# Patient Record
Sex: FEMALE | Race: WHITE | NOT HISPANIC OR LATINO | Employment: FULL TIME | URBAN - METROPOLITAN AREA
[De-identification: names, ages, dates, MRNs, and addresses within clinical notes are randomized per-mention and may not be internally consistent; named-entity substitution may affect disease eponyms.]

---

## 2018-04-20 ENCOUNTER — OFFICE VISIT (OUTPATIENT)
Dept: ENDOCRINOLOGY | Age: 53
End: 2018-04-20

## 2018-04-20 ENCOUNTER — TELEPHONE (OUTPATIENT)
Dept: ENDOCRINOLOGY | Age: 53
End: 2018-04-20

## 2018-04-20 VITALS
BODY MASS INDEX: 27.96 KG/M2 | WEIGHT: 163.8 LBS | DIASTOLIC BLOOD PRESSURE: 78 MMHG | SYSTOLIC BLOOD PRESSURE: 118 MMHG | HEIGHT: 64 IN

## 2018-04-20 DIAGNOSIS — IMO0002 UNCONTROLLED TYPE 2 DIABETES MELLITUS WITH COMPLICATION, WITHOUT LONG-TERM CURRENT USE OF INSULIN: Primary | ICD-10-CM

## 2018-04-20 DIAGNOSIS — K75.81 NASH (NONALCOHOLIC STEATOHEPATITIS): ICD-10-CM

## 2018-04-20 DIAGNOSIS — E78.2 MIXED HYPERLIPIDEMIA: ICD-10-CM

## 2018-04-20 PROCEDURE — 99204 OFFICE O/P NEW MOD 45 MIN: CPT | Performed by: NURSE PRACTITIONER

## 2018-04-20 RX ORDER — OMEGA-3-ACID ETHYL ESTERS 1 G/1
1 CAPSULE, LIQUID FILLED ORAL 2 TIMES DAILY
Qty: 120 CAPSULE | Refills: 4 | Status: SHIPPED | OUTPATIENT
Start: 2018-04-20 | End: 2018-10-24 | Stop reason: SDUPTHER

## 2018-04-20 RX ORDER — LISINOPRIL 10 MG/1
10 TABLET ORAL DAILY
Qty: 30 TABLET | Refills: 4 | Status: SHIPPED | OUTPATIENT
Start: 2018-04-20 | End: 2018-04-20

## 2018-04-20 RX ORDER — PIOGLITAZONEHYDROCHLORIDE 30 MG/1
30 TABLET ORAL DAILY
Qty: 30 TABLET | Refills: 4 | Status: SHIPPED | OUTPATIENT
Start: 2018-04-20 | End: 2018-09-18 | Stop reason: SDUPTHER

## 2018-04-20 RX ORDER — LISINOPRIL 5 MG/1
5 TABLET ORAL DAILY
Qty: 30 TABLET | Refills: 4 | Status: SHIPPED | OUTPATIENT
Start: 2018-04-20 | End: 2018-09-18 | Stop reason: SDUPTHER

## 2018-04-20 RX ORDER — ROSUVASTATIN CALCIUM 20 MG/1
20 TABLET, COATED ORAL NIGHTLY
Qty: 30 TABLET | Refills: 4 | Status: SHIPPED | OUTPATIENT
Start: 2018-04-20 | End: 2018-09-14 | Stop reason: SDUPTHER

## 2018-04-20 RX ORDER — ATORVASTATIN CALCIUM 20 MG/1
20 TABLET, FILM COATED ORAL DAILY
Qty: 30 TABLET | Refills: 4 | Status: SHIPPED | OUTPATIENT
Start: 2018-04-20 | End: 2018-04-20

## 2018-04-20 NOTE — PATIENT INSTRUCTIONS
Stop The Jardiance    Start Synjardy 12.5/1000 mg 1 by mouth twice daily with this medication take over-the-counter vitamin C 500 mg and at least 1000 international units vitamin D daily increase water at least 3-4 bottles daily    Start Januvia 100mg daily    Start omega-3 fatty acids-Lovaza 1 g twice daily never    Start actos 30mg daily    Start lisinopril 5mg daily    Crestor 20 mg once daily    home blood tests -  Blood glucose testing: 3-4 times daily, that are: fasting- 1st thing in morning before eating or drinking, before each meal and 1 or 2 hours after meal  Bedtime, anytime you feel symptoms of hyperglycemia or hypoglycemia (high or low blood sugars)

## 2018-04-20 NOTE — TELEPHONE ENCOUNTER
----- Message from Junior Luis sent at 4/20/2018 10:59 AM EDT -----  Contact: FADI FROM [facility]  FADI FROM [facility] STATED THAT THEY RECEIVED A SCRIPT FOR CRESTOR) 20 MG  AND LIPITOR SHE STATED THAT THE PATIENT ONLY NEEDS TO BE ON ONE OF THESES, SHE WOULD LIKE CLARIFICATION .     ALSO lisinopril (PRINIVIL,ZESTRIL) 5 MG tablet, FADI SAID SHE HAS A 10MG AND 5MG SCRIPT SHE IS TRYING TO GET THIS FIGURED OUT.     PLEASE CALL BACK AND ADVISE. [phone]

## 2018-04-23 ENCOUNTER — PRIOR AUTHORIZATION (OUTPATIENT)
Dept: ENDOCRINOLOGY | Age: 53
End: 2018-04-23

## 2018-04-24 ENCOUNTER — TELEPHONE (OUTPATIENT)
Dept: ENDOCRINOLOGY | Age: 53
End: 2018-04-24

## 2018-04-24 DIAGNOSIS — E55.9 VITAMIN D DEFICIENCY: ICD-10-CM

## 2018-04-24 DIAGNOSIS — IMO0002 UNCONTROLLED TYPE 2 DIABETES MELLITUS WITH COMPLICATION, WITHOUT LONG-TERM CURRENT USE OF INSULIN: Primary | ICD-10-CM

## 2018-04-24 DIAGNOSIS — E24.9 HYPERCORTICISM (HCC): ICD-10-CM

## 2018-04-24 DIAGNOSIS — E03.8 SECONDARY HYPOTHYROIDISM: Primary | ICD-10-CM

## 2018-04-24 LAB
25(OH)D3+25(OH)D2 SERPL-MCNC: 25.8 NG/ML (ref 30–100)
ACTH PLAS-MCNC: 12.8 PG/ML (ref 7.2–63.3)
ALBUMIN SERPL-MCNC: 4.7 G/DL (ref 3.5–5.2)
ALBUMIN/GLOB SERPL: 2 G/DL
ALDOST SERPL-MCNC: 7.6 NG/DL (ref 0–30)
ALP SERPL-CCNC: 54 U/L (ref 39–117)
ALT SERPL-CCNC: 115 U/L (ref 1–33)
AST SERPL-CCNC: 52 U/L (ref 1–32)
BILIRUB SERPL-MCNC: 0.9 MG/DL (ref 0.1–1.2)
BUN SERPL-MCNC: 15 MG/DL (ref 6–20)
BUN/CREAT SERPL: 24.6 (ref 7–25)
C PEPTIDE SERPL-MCNC: 3.8 NG/ML (ref 1.1–4.4)
CALCIUM SERPL-MCNC: 9.5 MG/DL (ref 8.6–10.5)
CHLORIDE SERPL-SCNC: 99 MMOL/L (ref 98–107)
CHOLEST SERPL-MCNC: 252 MG/DL (ref 0–200)
CO2 SERPL-SCNC: 24.9 MMOL/L (ref 22–29)
CORTIS SERPL-MCNC: 10.3 UG/DL
CREAT SERPL-MCNC: 0.61 MG/DL (ref 0.57–1)
DHEA SERPL-MCNC: 29 NG/DL (ref 31–701)
DHEA-S SERPL-MCNC: 20.5 UG/DL (ref 41.2–243.7)
FRUCTOSAMINE SERPL-SCNC: 307 UMOL/L (ref 0–285)
GFR SERPLBLD CREATININE-BSD FMLA CKD-EPI: 104 ML/MIN/1.73
GFR SERPLBLD CREATININE-BSD FMLA CKD-EPI: 126 ML/MIN/1.73
GLOBULIN SER CALC-MCNC: 2.3 GM/DL
GLUCOSE SERPL-MCNC: 148 MG/DL (ref 65–99)
HBA1C MFR BLD: 8.5 % (ref 4.8–5.6)
HDLC SERPL-MCNC: 39 MG/DL (ref 40–60)
INSULIN SERPL-ACNC: 17.1 UIU/ML (ref 2.6–24.9)
INTERPRETATION: NORMAL
LDLC SERPL CALC-MCNC: 190 MG/DL (ref 0–100)
Lab: NORMAL
POTASSIUM SERPL-SCNC: 4.5 MMOL/L (ref 3.5–5.2)
PROT SERPL-MCNC: 7 G/DL (ref 6–8.5)
SODIUM SERPL-SCNC: 140 MMOL/L (ref 136–145)
T3 SERPL-MCNC: 143.8 NG/DL (ref 80–200)
T3FREE SERPL-MCNC: 3.4 PG/ML (ref 2–4.4)
T4 FREE SERPL-MCNC: 1.25 NG/DL (ref 0.93–1.7)
T4 SERPL-MCNC: 8.82 MCG/DL (ref 4.5–11.7)
THYROGLOB AB SERPL-ACNC: <1 IU/ML (ref 0–0.9)
THYROPEROXIDASE AB SERPL-ACNC: 9 IU/ML (ref 0–34)
TRIGL SERPL-MCNC: 113 MG/DL (ref 0–150)
TSH SERPL DL<=0.005 MIU/L-ACNC: 4.29 MIU/ML (ref 0.27–4.2)
URATE SERPL-MCNC: 3.7 MG/DL (ref 2.4–5.7)
VLDLC SERPL CALC-MCNC: 22.6 MG/DL (ref 5–40)

## 2018-04-24 RX ORDER — ERGOCALCIFEROL 1.25 MG/1
50000 CAPSULE ORAL WEEKLY
Qty: 12 CAPSULE | Refills: 3 | Status: SHIPPED | OUTPATIENT
Start: 2018-04-24 | End: 2018-11-29 | Stop reason: SDUPTHER

## 2018-04-24 RX ORDER — LEVOTHYROXINE SODIUM 88 MCG
88 TABLET ORAL DAILY
Qty: 30 TABLET | Refills: 3 | Status: SHIPPED | OUTPATIENT
Start: 2018-04-24 | End: 2018-07-25

## 2018-04-24 RX ORDER — DEXAMETHASONE 1 MG
1 TABLET ORAL ONCE
Qty: 1 TABLET | Refills: 0 | Status: SHIPPED | OUTPATIENT
Start: 2018-04-24 | End: 2018-04-24

## 2018-04-24 NOTE — TELEPHONE ENCOUNTER
----- Message from MACEY Parekh sent at 4/24/2018  2:08 PM EDT -----  Labs show that she is hypothyroid sent a prescription over for Synthroid 88 MCG one daily on empty stomach in morning.  Empty stomach for 30 minutes after taking medication.  Recheck labs in 6 weeks.  Low vitamin D sent a prescription over for 50,000 once a week.  [facility] ordered DST testing.  She can get this done in 6 weeks with her other lab work

## 2018-04-24 NOTE — TELEPHONE ENCOUNTER
----- Message from MACEY Parekh sent at 4/24/2018  8:58 AM EDT -----  Contact: PATIENT   I will lower the dose of Synjardy 12.5/500 twice daily.  If this does not work we have to go a route with treatment.  And please inform patient that pharmacist does not have her labs her self-monitoring blood glucose and her history in front of the home and they go by what in the PI    ----- Message -----  From: Marta Carcamo MA  Sent: 4/23/2018   4:41 PM  To: MACEY Parekh    Patient states that had only taken the 1 pill and it caused extreme stomach pain and diarrhea. She states that she spoke to her pharmacist and he told her that it was probably too much metformin for her system to handle. She is asking for a lower dose or to not take it at all. Please advise.     ----- Message -----  From: MACEY Parekh  Sent: 4/23/2018   1:08 PM  To: Marta Carcamo MA    Restart the synjardy - 1 in the AM only for 1 week- then increase to twice daily.   ----- Message -----  From: Marta Carcamo MA  Sent: 4/23/2018  10:46 AM  To: MACEY Parekh    Please advise.     ----- Message -----  From: Junior Luis  Sent: 4/23/2018   9:33 AM  To: Marta Carcamo MA    PATIENT SAID THAT SHE STARTED SYNJARDI ON SUNDAY, SHE STARTED CRAMPING AND THEN HAD DIAHERREA, SHE HAS 3 MORE PILLS TO TRY. SHE WAS SCARED TO TRY ANYTHING ELSE SINCE SHE GOT SICK. THE PHARMACIST TOLD HER IT MAY BE THAT IT WAS TOO MUCH SINCE SHE WAS TAKING METFORMIN 500,     WAS TAKEN OFF METFORMIN DUE TO LIVER BEING ELEVATED. PATIENT HAS SPOT ON LIVER BUT SAID IT WASN'T ANYTHING TO WORRY ABOUT.     PATIENT WANTS TO KNOW IF SHE NEEDS TO TAKE A LOWER DOSE OF SYNJARDI, OR TRY IT AGAIN. SHE WOULD LIKE TO BE ADVISED WHAT TO Do.     SHE SAID THAT HER BODY DOES NOT DO WELL ON MEDICATIONS.   SHE ALSO HAS A WHOLE BOTTLE OF METFORMIN IF SHE NEEDS TO GO BACK ON THAT JARDIANCE, SHE HAS BEEN ON THAT FOR THE PAST COUPLE OF MONTHS. SHE HAS 8  PILLS OF JARDIANCE.    PATIENT SAID SUGAR IS LIKE 150 DOWN AND WANTS TO KNOW IF SHE NEEDS TO TAKE THE JARDIANCE TO GET IT DOWN OR TRY THE SYNJARDI AGAIN.     BEST # [phone]

## 2018-04-27 ENCOUNTER — TELEPHONE (OUTPATIENT)
Dept: ENDOCRINOLOGY | Age: 53
End: 2018-04-27

## 2018-04-27 NOTE — TELEPHONE ENCOUNTER
----- Message from MACEY Parekh sent at 4/26/2018 10:00 PM EDT -----  Contact: PATIENT  Introduce the Synjardy.  She issues to metformin it would be the same sinuses I gave her Jardiance separate.  She is on metformin I added Jardiance to the metformin it's this same difference is her choice when she wants to take her medicine ultimately her diabetes is out of control that's why she was sent to our office.  ----- Message -----  From: Marta Carcamo MA  Sent: 4/25/2018  11:17 AM  To: MACEY Parekh    You can see previous conversation in chart. Please advise.     ----- Message -----  From: Junior Luis  Sent: 4/25/2018  10:55 AM  To: Marta Carcamo MA    PATIENT STATED THAT YOU ALL WERE SPEAKING ABOUT SCRIPT YESTERDAY AND SHE WANTED TO LET YOU KNOW THAT ONE IS STILL PENDING. omega-3 acid ethyl esters (LOVAZA)    PATIENT IS GOING ON VACATION IN 15 DAYS AND SHE HAS 3 MORE MEDICATION TO INTRODUCE INTO HER SYSTEM, SHE DOES NOT DO WELL ON MEDICATION. SHE HAS NOT HAD A BOWEL MOVEMENT IN 3 DAYS, DUE TO TAKING IMODIUM Ad.   PATIENT IS WANTING TO KNOW IF SHE CAN TAKE Empagliflozin (JARDIANCE) WHICH SHE IS USE To, UNTIL AFTER VACATION.     SHE WAS PRESCRIBED Empagliflozin-Metformin HCl 12.5-500 WHICH THE ORIGINAL SCRIPT WAS Empagliflozin-Metformin HCl 12.5-1000 AND IT GAVE HER DIARRHEA      PATIENT WOULD LIKE TO KNOW IF IT WOULD BE Ok. SHE ONLY HAS 7 PILLS OF THE JARDIANCE, IF IT IS OK FOR HER TO TAKE UNTIL AFTER VACATION, SHE WOULD NEED A SCRIPT CALLED In.    SUGAR  TWO HOURS AFTER SHE ATE LAST NIGHT, SO THE PILLS SHE IS TAKING IS WORKING, BUT SHE HAS TINGLING IN LEFT HAND, AND WHOLE BODY HURTS.  SAID THAT IT DONE THIS WHEN SHE WAS TAKING THE CHOLESTEROL MEDICATION BEFORE. SHE THINKS IT MAY TAKE TIME FOR HER BODY TO GET USE TO It.  SHE IS INTRODUCING 2 PILLS A DAY, SHE STILL HAS VIT. D WHICH SHE IS GOING TO TAKE ON Saturday. AND SHE DOES NOT HAVE THE OMEGA 3 YET.

## 2018-06-05 ENCOUNTER — RESULTS ENCOUNTER (OUTPATIENT)
Dept: ENDOCRINOLOGY | Age: 53
End: 2018-06-05

## 2018-06-05 DIAGNOSIS — E24.9 HYPERCORTICISM (HCC): ICD-10-CM

## 2018-06-05 DIAGNOSIS — E03.8 SECONDARY HYPOTHYROIDISM: ICD-10-CM

## 2018-06-11 ENCOUNTER — TELEPHONE (OUTPATIENT)
Dept: ENDOCRINOLOGY | Age: 53
End: 2018-06-11

## 2018-06-11 NOTE — TELEPHONE ENCOUNTER
----- Message from Junior York sent at 6/11/2018  1:33 PM EDT -----  Contact: PATIENT  PATIENT CALLED IN REFERENCE TO TAKING THE PILL DEXAMETHASONE 1 MG. PATIENT WANTS TO KNOW IF SHE IS SUPPOSE TO FAST AFTER TAKING THE PILL OR IF SHE CAN CONTINUE WITH HER DAILY ROUTINE WITH EATING AND DRINKING BEFORE THE LAB TESTING THE FOLLOWING MORNING BETWEEN 8-9 AM. 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 PATIENT WOULD LIKE A CALL BACK.    [facility] [phone] LEAVE MESSAGE IF PATIENT DOESN'T ANSWER

## 2018-06-14 LAB
ACTH PLAS-MCNC: 3.1 PG/ML (ref 7.2–63.3)
CORTIS SERPL-MCNC: 0.9 UG/DL
T3FREE SERPL-MCNC: 3.4 PG/ML (ref 2–4.4)
T4 FREE SERPL-MCNC: 2.1 NG/DL (ref 0.93–1.7)
THYROGLOB AB SERPL-ACNC: <1 IU/ML (ref 0–0.9)
THYROPEROXIDASE AB SERPL-ACNC: 8 IU/ML (ref 0–34)
TSH SERPL DL<=0.005 MIU/L-ACNC: 0.11 MIU/ML (ref 0.27–4.2)

## 2018-06-18 ENCOUNTER — TELEPHONE (OUTPATIENT)
Dept: ENDOCRINOLOGY | Age: 53
End: 2018-06-18

## 2018-06-18 NOTE — TELEPHONE ENCOUNTER
----- Message from MACEY Parekh sent at 6/14/2018  4:24 PM EDT -----  That suppression test within normal limits.

## 2018-07-11 ENCOUNTER — LAB (OUTPATIENT)
Dept: ENDOCRINOLOGY | Age: 53
End: 2018-07-11

## 2018-07-11 DIAGNOSIS — E78.2 MIXED HYPERLIPIDEMIA: ICD-10-CM

## 2018-07-11 DIAGNOSIS — IMO0002 UNCONTROLLED TYPE 2 DIABETES MELLITUS WITH COMPLICATION, WITHOUT LONG-TERM CURRENT USE OF INSULIN: ICD-10-CM

## 2018-07-11 DIAGNOSIS — K75.81 NASH (NONALCOHOLIC STEATOHEPATITIS): ICD-10-CM

## 2018-07-12 LAB
25(OH)D3+25(OH)D2 SERPL-MCNC: 42.1 NG/ML (ref 30–100)
ALBUMIN SERPL-MCNC: 4.9 G/DL (ref 3.5–5.2)
ALBUMIN/CREAT UR: 15.7 MG/G CREAT (ref 0–30)
ALBUMIN/GLOB SERPL: 2.3 G/DL
ALP SERPL-CCNC: 37 U/L (ref 39–117)
ALT SERPL-CCNC: 18 U/L (ref 1–33)
AST SERPL-CCNC: 11 U/L (ref 1–32)
BASOPHILS # BLD AUTO: 0.03 10*3/MM3 (ref 0–0.2)
BASOPHILS NFR BLD AUTO: 0.5 % (ref 0–1.5)
BILIRUB SERPL-MCNC: 1 MG/DL (ref 0.1–1.2)
BUN SERPL-MCNC: 17 MG/DL (ref 6–20)
BUN/CREAT SERPL: 23.3 (ref 7–25)
CALCIUM SERPL-MCNC: 9.3 MG/DL (ref 8.6–10.5)
CHLORIDE SERPL-SCNC: 104 MMOL/L (ref 98–107)
CHOLEST SERPL-MCNC: 127 MG/DL (ref 0–200)
CO2 SERPL-SCNC: 26.3 MMOL/L (ref 22–29)
CREAT SERPL-MCNC: 0.73 MG/DL (ref 0.57–1)
CREAT UR-MCNC: 61.8 MG/DL
DIFFERENTIAL COMMENT: NORMAL
EOSINOPHIL # BLD AUTO: 0.21 10*3/MM3 (ref 0–0.7)
EOSINOPHIL NFR BLD AUTO: 3.7 % (ref 0.3–6.2)
ERYTHROCYTE [DISTWIDTH] IN BLOOD BY AUTOMATED COUNT: 16.4 % (ref 11.7–13)
GLOBULIN SER CALC-MCNC: 2.1 GM/DL
GLUCOSE SERPL-MCNC: 82 MG/DL (ref 65–99)
HBA1C MFR BLD: 6.47 % (ref 4.8–5.6)
HCT VFR BLD AUTO: 39.2 % (ref 35.6–45.5)
HDLC SERPL-MCNC: 57 MG/DL (ref 40–60)
HGB BLD-MCNC: 11.7 G/DL (ref 11.9–15.5)
IMM GRANULOCYTES # BLD: 0.01 10*3/MM3 (ref 0–0.03)
IMM GRANULOCYTES NFR BLD: 0.2 % (ref 0–0.5)
INSULIN SERPL-ACNC: 8.4 UIU/ML (ref 2.6–24.9)
INTERPRETATION: NORMAL
LDLC SERPL CALC-MCNC: 58 MG/DL (ref 0–100)
LYMPHOCYTES # BLD AUTO: 2.01 10*3/MM3 (ref 0.9–4.8)
LYMPHOCYTES NFR BLD AUTO: 35.8 % (ref 19.6–45.3)
Lab: NORMAL
MCH RBC QN AUTO: 19.7 PG (ref 26.9–32)
MCHC RBC AUTO-ENTMCNC: 29.8 G/DL (ref 32.4–36.3)
MCV RBC AUTO: 66 FL (ref 80.5–98.2)
MICROALBUMIN UR-MCNC: 9.7 UG/ML
MONOCYTES # BLD AUTO: 0.38 10*3/MM3 (ref 0.2–1.2)
MONOCYTES NFR BLD AUTO: 6.8 % (ref 5–12)
NEUTROPHILS # BLD AUTO: 2.99 10*3/MM3 (ref 1.9–8.1)
NEUTROPHILS NFR BLD AUTO: 53.2 % (ref 42.7–76)
PLATELET # BLD AUTO: 184 10*3/MM3 (ref 140–500)
PLATELET BLD QL SMEAR: NORMAL
POTASSIUM SERPL-SCNC: 4.4 MMOL/L (ref 3.5–5.2)
PROT SERPL-MCNC: 7 G/DL (ref 6–8.5)
RBC # BLD AUTO: 5.94 10*6/MM3 (ref 3.9–5.2)
RBC MORPH BLD: NORMAL
SODIUM SERPL-SCNC: 144 MMOL/L (ref 136–145)
T3FREE SERPL-MCNC: 3.5 PG/ML (ref 2–4.4)
T4 FREE SERPL-MCNC: 2.09 NG/DL (ref 0.93–1.7)
THYROGLOB AB SERPL-ACNC: <1 IU/ML (ref 0–0.9)
THYROPEROXIDASE AB SERPL-ACNC: 7 IU/ML (ref 0–34)
TRIGL SERPL-MCNC: 58 MG/DL (ref 0–150)
TSH SERPL DL<=0.005 MIU/L-ACNC: 0.08 MIU/ML (ref 0.27–4.2)
URATE SERPL-MCNC: 2.7 MG/DL (ref 2.4–5.7)
VLDLC SERPL CALC-MCNC: 11.6 MG/DL (ref 5–40)
WBC # BLD AUTO: 5.62 10*3/MM3 (ref 4.5–10.7)

## 2018-07-25 ENCOUNTER — OFFICE VISIT (OUTPATIENT)
Dept: ENDOCRINOLOGY | Age: 53
End: 2018-07-25

## 2018-07-25 VITALS
DIASTOLIC BLOOD PRESSURE: 78 MMHG | HEIGHT: 64 IN | BODY MASS INDEX: 27.49 KG/M2 | SYSTOLIC BLOOD PRESSURE: 122 MMHG | WEIGHT: 161 LBS

## 2018-07-25 DIAGNOSIS — E78.2 MIXED HYPERLIPIDEMIA: ICD-10-CM

## 2018-07-25 DIAGNOSIS — E03.8 SECONDARY HYPOTHYROIDISM: ICD-10-CM

## 2018-07-25 DIAGNOSIS — IMO0002 UNCONTROLLED TYPE 2 DIABETES MELLITUS WITH COMPLICATION, WITHOUT LONG-TERM CURRENT USE OF INSULIN: Primary | ICD-10-CM

## 2018-07-25 PROBLEM — D56.8 BETA 0 THALASSEMIA (HCC): Status: ACTIVE | Noted: 2018-07-25

## 2018-07-25 PROCEDURE — 99214 OFFICE O/P EST MOD 30 MIN: CPT | Performed by: NURSE PRACTITIONER

## 2018-07-25 RX ORDER — LEVOTHYROXINE SODIUM 75 MCG
75 TABLET ORAL DAILY
Qty: 30 TABLET | Refills: 3 | Status: SHIPPED | OUTPATIENT
Start: 2018-07-25 | End: 2018-09-07

## 2018-07-25 NOTE — PATIENT INSTRUCTIONS
Stop Synthroid 88 MCG    Start Synthroid 75 MCG    Labs only in 6 weeks to check thyroid.    No other changes at this time

## 2018-07-25 NOTE — PROGRESS NOTES
Maru Martinez  presents to the office  for the follow-up appointment for Type 2 diabetes mellitus.     The diabete's condition location is throughout the system with the  clinical course has fluctuated, the severity is Mild, and the modifying/allievating factors are oral medications.  Medications and  Dosages were  reviewed with Maru Martinez and suggested that compliance most of the time.    The patient reports associated symptoms of hyperglycemia have been flushed and feeling hot and associated symptoms of hypoglycemia have been tingling in hands and feet , with their  hypoglycemia threshold for symptoms is 70 mg/dl .     The patient is currently on oral medications .      Compliance with blood glucose monitoring: fair.     Meal panning: The patient is using avoidance of concentrated sweets.    The patient is currently taking home blood tests - Blood glucose testin-1 times daily, that are:  fasting- 1st thing in morning before eating or drinking    Last instructions given were:  Stop The Jardiance     Start Synjardy 12.1000 mg 1 by mouth twice daily with this medication take over-the-counter vitamin C 500 mg and at least 1000 international units vitamin D daily increase water at least 3-4 bottles daily     Start Januvia 100mg daily     Start omega-3 fatty acids-Lovaza 1 g twice daily never     Start actos 30mg daily     Start lisinopril 5mg daily     Crestor 20 mg once daily    Home blood glucose testing daily: 0-1  FB to 90 mg/dl    Last reported blood glucose readings are:  Home blood glucose testing daily: 0-1  FB to 140 mg/dl    Patterns reported per patient are none.         The following portions of the patient's history were reviewed and updated as appropriate: current medications, past family history, past medical history, past social history, past surgical history and problem list.      Current Outpatient Prescriptions:   •  Empagliflozin-Metformin HCl 12.5-500 MG tablet, Take 12.5 mg by  "mouth 2 (Two) Times a Day., Disp: 60 tablet, Rfl: 4  •  glucose blood (FREESTYLE LITE) test strip, Test blood glucose 3 times daily, Disp: 300 each, Rfl: 2  •  lisinopril (PRINIVIL,ZESTRIL) 5 MG tablet, Take 1 tablet by mouth Daily., Disp: 30 tablet, Rfl: 4  •  omega-3 acid ethyl esters (LOVAZA) 1 g capsule, Take 1 capsule by mouth 2 (Two) Times a Day., Disp: 120 capsule, Rfl: 4  •  pioglitazone (ACTOS) 30 MG tablet, Take 1 tablet by mouth Daily., Disp: 30 tablet, Rfl: 4  •  rosuvastatin (CRESTOR) 20 MG tablet, Take 1 tablet by mouth Every Night., Disp: 30 tablet, Rfl: 4  •  SITagliptin (JANUVIA) 100 MG tablet, Take 1 tablet by mouth Daily., Disp: 30 tablet, Rfl: 4  •  vitamin D (ERGOCALCIFEROL) 36390 units capsule capsule, Take 1 capsule by mouth 1 (One) Time Per Week., Disp: 12 capsule, Rfl: 3  •  SYNTHROID 75 MCG tablet, Take 1 tablet by mouth Daily.  on an emptying stomach, Disp: 30 tablet, Rfl: 3    Patient Active Problem List    Diagnosis   • Beta 0 thalassemia (CMS/HCC) [D56.8]   • Uncontrolled type 2 diabetes mellitus with complication, without long-term current use of insulin (CMS/HCC) [E11.8, E11.65]   • Mixed hyperlipidemia [E78.2]   • PARKER (nonalcoholic steatohepatitis) [K75.81]       Review of Systems   A comprehensive review of systems was negative.- 14 systems reviewed.      Objective:     Wt Readings from Last 3 Encounters:   07/25/18 73 kg (161 lb)   04/20/18 74.3 kg (163 lb 12.8 oz)     Temp Readings from Last 3 Encounters:   No data found for Temp     BP Readings from Last 3 Encounters:   07/25/18 122/78   04/20/18 118/78     Pulse Readings from Last 3 Encounters:   No data found for Pulse        /78   Ht 162.6 cm (64.02\")   Wt 73 kg (161 lb)   BMI 27.62 kg/m²     General appearance:  alert, cooperative, delirious, fatigued, nourished\" \"appears stated age and cooperative\" \"NAD   Neck: no carotid bruit, supple, symmetrical, trachea midline and thyroid not enlarged, symmetric, no " "tenderness/mass/nodules   Thyroid:  no palpable nodule, no enlargement, no tenderness   Lung:  \"clear to auscultation bilaterally\" \"no abnormal breath sounds  \" effort was normal, no labored breath, no use of accessory muscles.   Heart: regular rate and rhythm, S1, S2 normal, no murmur, click, rub or gallop      Abdomen:  normal bowel sounds- 4 quads, soft non-tender and contour - slt rounded      Extremities: extremities normal, atraumatic, no cyanosis or edema extremities normal, atraumatic, no cyanosis or edema\" WNL - gait and station, strength and stability\"       Skin:   Pulses:  warm and dry, no hyperpigmentation, normal skin coloring, or suspicious lesions   2+ and symmetric   Neuro: Alert and oriented x3. Gait normal. Reflexes and motor strength normal and symmetric. Cranial nerves 2-12 and sensation grossly intact.      Psych: behavior - normal, judgement - normal, mood - normal, affect - normal                 Lab Review  Results for orders placed or performed in visit on 07/11/18   Comprehensive Metabolic Panel   Result Value Ref Range    Glucose 82 65 - 99 mg/dL    BUN 17 6 - 20 mg/dL    Creatinine 0.73 0.57 - 1.00 mg/dL    eGFR Non African Am 84 >60 mL/min/1.73    eGFR African Am 102 >60 mL/min/1.73    BUN/Creatinine Ratio 23.3 7.0 - 25.0    Sodium 144 136 - 145 mmol/L    Potassium 4.4 3.5 - 5.2 mmol/L    Chloride 104 98 - 107 mmol/L    Total CO2 26.3 22.0 - 29.0 mmol/L    Calcium 9.3 8.6 - 10.5 mg/dL    Total Protein 7.0 6.0 - 8.5 g/dL    Albumin 4.90 3.50 - 5.20 g/dL    Globulin 2.1 gm/dL    A/G Ratio 2.3 g/dL    Total Bilirubin 1.0 0.1 - 1.2 mg/dL    Alkaline Phosphatase 37 (L) 39 - 117 U/L    AST (SGOT) 11 1 - 32 U/L    ALT (SGPT) 18 1 - 33 U/L   Hemoglobin A1c   Result Value Ref Range    Hemoglobin A1C 6.47 (H) 4.80 - 5.60 %   Insulin, Total   Result Value Ref Range    Insulin 8.4 2.6 - 24.9 uIU/mL   Lipid Panel   Result Value Ref Range    Total Cholesterol 127 0 - 200 mg/dL    Triglycerides 58 0 - " 150 mg/dL    HDL Cholesterol 57 40 - 60 mg/dL    VLDL Cholesterol 11.6 5 - 40 mg/dL    LDL Cholesterol  58 0 - 100 mg/dL   Uric Acid   Result Value Ref Range    Uric Acid 2.7 2.4 - 5.7 mg/dL   Vitamin D 25 Hydroxy   Result Value Ref Range    25 Hydroxy, Vitamin D 42.1 30.0 - 100.0 ng/mL   TSH   Result Value Ref Range    TSH 0.079 (L) 0.270 - 4.200 mIU/mL   Thyroid Antibodies   Result Value Ref Range    Thyroid Peroxidase Antibody 7 0 - 34 IU/mL    Thyroglobulin Ab <1.0 0.0 - 0.9 IU/mL   T4, Free   Result Value Ref Range    Free T4 2.09 (H) 0.93 - 1.70 ng/dL   T3, Free   Result Value Ref Range    T3, Free 3.5 2.0 - 4.4 pg/mL   Microalbumin / Creatinine Urine Ratio   Result Value Ref Range    Creatinine, Urine 61.8 Not Estab. mg/dL    Microalbumin, Urine 9.7 Not Estab. ug/mL    Microalbumin/Creatinine Ratio 15.7 0.0 - 30.0 mg/g creat   Cardiovascular Risk Assessment   Result Value Ref Range    Interpretation Note    Diabetes Patient Education   Result Value Ref Range    PDF Image Not applicable    CBC & Differential   Result Value Ref Range    WBC 5.62 4.50 - 10.70 10*3/mm3    RBC 5.94 (H) 3.90 - 5.20 10*6/mm3    Hemoglobin 11.7 (L) 11.9 - 15.5 g/dL    Hematocrit 39.2 35.6 - 45.5 %    MCV 66.0 (L) 80.5 - 98.2 fL    MCH 19.7 (L) 26.9 - 32.0 pg    MCHC 29.8 (L) 32.4 - 36.3 g/dL    RDW 16.4 (H) 11.7 - 13.0 %    Platelets 184 140 - 500 10*3/mm3    Neutrophil Rel % 53.2 42.7 - 76.0 %    Lymphocyte Rel % 35.8 19.6 - 45.3 %    Monocyte Rel % 6.8 5.0 - 12.0 %    Eosinophil Rel % 3.7 0.3 - 6.2 %    Basophil Rel % 0.5 0.0 - 1.5 %    Neutrophils Absolute 2.99 1.90 - 8.10 10*3/mm3    Lymphocytes Absolute 2.01 0.90 - 4.80 10*3/mm3    Monocytes Absolute 0.38 0.20 - 1.20 10*3/mm3    Eosinophils Absolute 0.21 0.00 - 0.70 10*3/mm3    Basophils Absolute 0.03 0.00 - 0.20 10*3/mm3    Immature Granulocyte Rel % 0.2 0.0 - 0.5 %    Immature Grans Absolute 0.01 0.00 - 0.03 10*3/mm3   Manual Differential   Result Value Ref Range     Differential Comment Comment     Comment Comment     Plt Comment Comment            Assessment:   Maru was seen today for follow-up.    Diagnoses and all orders for this visit:    Uncontrolled type 2 diabetes mellitus with complication, without long-term current use of insulin (CMS/[facility])  -     Empagliflozin-Metformin HCl 12.5-500 MG tablet; Take 12.5 mg by mouth 2 (Two) Times a Day.  -     Comprehensive Metabolic Panel; Future  -     C-Peptide; Future  -     Hemoglobin A1c; Future  -     Insulin, Total; Future  -     Lipid Panel; Future  -     Microalbumin / Creatinine Urine Ratio - Urine, Clean Catch; Future  -     Uric Acid; Future  -     Vitamin D 25 Hydroxy; Future  -     TSH; Future  -     Thyroid Antibodies; Future  -     T4, Free; Future  -     T3, Free; Future    Mixed hyperlipidemia  -     Comprehensive Metabolic Panel; Future  -     Lipid Panel; Future    Secondary hypothyroidism  -     SYNTHROID 75 MCG tablet; Take 1 tablet by mouth Daily.  on an emptying stomach  -     T3, Free; Future  -     T4; Future  -     T4, Free; Future  -     T3; Future  -     TSH; Future  -     Comprehensive Metabolic Panel; Future          Plan:   In summary: Patient taking only one Synjardy 12.5/500mg in the AM and OTC omega 3 fish oil. Reviewed labs prior obtained to the visit and at this time the below medication will be completed.  Instructions for follow-up with labs 2 weeks prior was given.  Patient verbally stated understood all instructions.      Medication changes:   Stop Synthroid 88 MCG    Start Synthroid 75 MCG    Labs only in 6 weeks to check thyroid.    No other changes at this time      Additional instructions  home blood tests -  Blood glucose testing: 3 times daily, that are:  fasting- 1st thing in morning before eating or drinking  before each meal and 1 or 2 hours after meal  bedtime  anytime you feel symptoms of hyperglycemia or hypoglycemia (high or low blood sugars)        Education:  interpretation of  lab results, blood sugar goals, complications of diabetes mellitus, hypoglycemia prevention and treatment, exercise, illness management, self-monitoring of blood glucose skills, nutrition and carbohydrate counting    The total face to face time spent was  25 minutes  with additional education given: 14 minutes (greater than 50% of the total time) was spent with counseling and coordination of care on: SMBG with goals, Side effects profiles with medications, medication use and purposes,     Return in about 3 months (around 10/25/2018), or if symptoms worsen or fail to improve, for Recheck. Keep appt with Dr. Arndt - 2 weeks prior for labs      Dragon transcription disclaimer     Much of this encounter note is an electronic transcription/translation of spoken language to printed text. The electronic translation of spoken language may permit erroneous, or at times, nonsensical words or phrases to be inadvertently transcribed. Although I have reviewed the note for such errors, some may still exist.

## 2018-09-05 ENCOUNTER — RESULTS ENCOUNTER (OUTPATIENT)
Dept: ENDOCRINOLOGY | Age: 53
End: 2018-09-05

## 2018-09-05 DIAGNOSIS — E03.8 SECONDARY HYPOTHYROIDISM: ICD-10-CM

## 2018-09-06 LAB
T3 SERPL-MCNC: 129.2 NG/DL (ref 80–200)
T3FREE SERPL-MCNC: 3.2 PG/ML (ref 2–4.4)
T4 FREE SERPL-MCNC: 1.91 NG/DL (ref 0.93–1.7)
T4 SERPL-MCNC: 12.13 MCG/DL (ref 4.5–11.7)
TSH SERPL DL<=0.005 MIU/L-ACNC: 0.08 MIU/ML (ref 0.27–4.2)

## 2018-09-07 DIAGNOSIS — E03.8 SECONDARY HYPOTHYROIDISM: Primary | ICD-10-CM

## 2018-09-07 RX ORDER — LEVOTHYROXINE SODIUM 0.05 MG/1
50 TABLET ORAL DAILY
Qty: 30 TABLET | Refills: 3 | Status: SHIPPED | OUTPATIENT
Start: 2018-09-07 | End: 2018-11-29 | Stop reason: SDUPTHER

## 2018-09-12 ENCOUNTER — TELEPHONE (OUTPATIENT)
Dept: ENDOCRINOLOGY | Age: 53
End: 2018-09-12

## 2018-09-12 NOTE — TELEPHONE ENCOUNTER
----- Message from MACEY Parekh sent at 9/7/2018  3:12 PM EDT -----  The thyroid is leveling out we will have to lower dose again and recheck in 6 weeks

## 2018-09-14 DIAGNOSIS — E78.2 MIXED HYPERLIPIDEMIA: ICD-10-CM

## 2018-09-14 DIAGNOSIS — IMO0002 UNCONTROLLED TYPE 2 DIABETES MELLITUS WITH COMPLICATION, WITHOUT LONG-TERM CURRENT USE OF INSULIN: ICD-10-CM

## 2018-09-17 RX ORDER — ROSUVASTATIN CALCIUM 20 MG/1
20 TABLET, COATED ORAL NIGHTLY
Qty: 30 TABLET | Refills: 0 | Status: SHIPPED | OUTPATIENT
Start: 2018-09-17 | End: 2018-10-24 | Stop reason: SDUPTHER

## 2018-09-17 RX ORDER — SITAGLIPTIN 100 MG/1
100 TABLET, FILM COATED ORAL DAILY
Qty: 30 TABLET | Refills: 0 | Status: SHIPPED | OUTPATIENT
Start: 2018-09-17 | End: 2018-10-19 | Stop reason: SDUPTHER

## 2018-09-18 DIAGNOSIS — IMO0002 UNCONTROLLED TYPE 2 DIABETES MELLITUS WITH COMPLICATION, WITHOUT LONG-TERM CURRENT USE OF INSULIN: ICD-10-CM

## 2018-09-19 RX ORDER — PIOGLITAZONEHYDROCHLORIDE 30 MG/1
30 TABLET ORAL DAILY
Qty: 30 TABLET | Refills: 0 | Status: SHIPPED | OUTPATIENT
Start: 2018-09-19 | End: 2018-10-19 | Stop reason: SDUPTHER

## 2018-09-19 RX ORDER — LISINOPRIL 5 MG/1
5 TABLET ORAL DAILY
Qty: 30 TABLET | Refills: 0 | Status: SHIPPED | OUTPATIENT
Start: 2018-09-19 | End: 2018-10-19 | Stop reason: SDUPTHER

## 2018-10-19 ENCOUNTER — RESULTS ENCOUNTER (OUTPATIENT)
Dept: ENDOCRINOLOGY | Age: 53
End: 2018-10-19

## 2018-10-19 DIAGNOSIS — IMO0002 UNCONTROLLED TYPE 2 DIABETES MELLITUS WITH COMPLICATION, WITHOUT LONG-TERM CURRENT USE OF INSULIN: ICD-10-CM

## 2018-10-19 DIAGNOSIS — E03.8 SECONDARY HYPOTHYROIDISM: ICD-10-CM

## 2018-10-19 RX ORDER — LISINOPRIL 5 MG/1
5 TABLET ORAL DAILY
Qty: 30 TABLET | Refills: 0 | Status: SHIPPED | OUTPATIENT
Start: 2018-10-19 | End: 2018-10-26 | Stop reason: SDUPTHER

## 2018-10-19 RX ORDER — PIOGLITAZONEHYDROCHLORIDE 30 MG/1
30 TABLET ORAL DAILY
Qty: 30 TABLET | Refills: 0 | Status: SHIPPED | OUTPATIENT
Start: 2018-10-19 | End: 2018-10-24 | Stop reason: SDUPTHER

## 2018-10-19 RX ORDER — SITAGLIPTIN 100 MG/1
100 TABLET, FILM COATED ORAL DAILY
Qty: 30 TABLET | Refills: 0 | Status: SHIPPED | OUTPATIENT
Start: 2018-10-19 | End: 2018-10-24 | Stop reason: SDUPTHER

## 2018-10-24 DIAGNOSIS — E78.2 MIXED HYPERLIPIDEMIA: ICD-10-CM

## 2018-10-24 DIAGNOSIS — IMO0002 UNCONTROLLED TYPE 2 DIABETES MELLITUS WITH COMPLICATION, WITHOUT LONG-TERM CURRENT USE OF INSULIN: ICD-10-CM

## 2018-10-24 RX ORDER — PIOGLITAZONEHYDROCHLORIDE 30 MG/1
30 TABLET ORAL DAILY
Qty: 30 TABLET | Refills: 0 | Status: SHIPPED | OUTPATIENT
Start: 2018-10-24 | End: 2018-10-26 | Stop reason: SDUPTHER

## 2018-10-24 RX ORDER — ROSUVASTATIN CALCIUM 20 MG/1
20 TABLET, COATED ORAL NIGHTLY
Qty: 90 TABLET | Refills: 0 | Status: SHIPPED | OUTPATIENT
Start: 2018-10-24 | End: 2018-11-29 | Stop reason: SDUPTHER

## 2018-10-24 RX ORDER — OMEGA-3-ACID ETHYL ESTERS 1 G/1
1 CAPSULE, LIQUID FILLED ORAL 2 TIMES DAILY
Qty: 180 CAPSULE | Refills: 0 | Status: SHIPPED | OUTPATIENT
Start: 2018-10-24 | End: 2018-11-29 | Stop reason: SDUPTHER

## 2018-10-25 DIAGNOSIS — IMO0002 UNCONTROLLED TYPE 2 DIABETES MELLITUS WITH COMPLICATION, WITHOUT LONG-TERM CURRENT USE OF INSULIN: ICD-10-CM

## 2018-10-25 DIAGNOSIS — E78.2 MIXED HYPERLIPIDEMIA: Primary | ICD-10-CM

## 2018-10-26 RX ORDER — LISINOPRIL 5 MG/1
5 TABLET ORAL DAILY
Qty: 90 TABLET | Refills: 0 | Status: SHIPPED | OUTPATIENT
Start: 2018-10-26 | End: 2018-11-29 | Stop reason: SDUPTHER

## 2018-10-26 RX ORDER — PIOGLITAZONEHYDROCHLORIDE 30 MG/1
30 TABLET ORAL DAILY
Qty: 90 TABLET | Refills: 0 | Status: SHIPPED | OUTPATIENT
Start: 2018-10-26 | End: 2018-11-29 | Stop reason: SDUPTHER

## 2018-11-15 ENCOUNTER — LAB (OUTPATIENT)
Dept: ENDOCRINOLOGY | Age: 53
End: 2018-11-15

## 2018-11-15 DIAGNOSIS — E78.2 MIXED HYPERLIPIDEMIA: ICD-10-CM

## 2018-11-15 DIAGNOSIS — IMO0002 UNCONTROLLED TYPE 2 DIABETES MELLITUS WITH COMPLICATION, WITHOUT LONG-TERM CURRENT USE OF INSULIN: ICD-10-CM

## 2018-11-16 LAB
25(OH)D3+25(OH)D2 SERPL-MCNC: 47.6 NG/ML (ref 30–100)
ALBUMIN SERPL-MCNC: 4.8 G/DL (ref 3.5–5.2)
ALBUMIN/GLOB SERPL: 2 G/DL
ALP SERPL-CCNC: 43 U/L (ref 39–117)
ALT SERPL-CCNC: 18 U/L (ref 1–33)
AST SERPL-CCNC: 14 U/L (ref 1–32)
BILIRUB SERPL-MCNC: 1 MG/DL (ref 0.1–1.2)
BUN SERPL-MCNC: 15 MG/DL (ref 6–20)
BUN/CREAT SERPL: 19.5 (ref 7–25)
C PEPTIDE SERPL-MCNC: 2.9 NG/ML (ref 1.1–4.4)
CALCIUM SERPL-MCNC: 10.1 MG/DL (ref 8.6–10.5)
CHLORIDE SERPL-SCNC: 100 MMOL/L (ref 98–107)
CHOLEST SERPL-MCNC: 146 MG/DL (ref 0–200)
CO2 SERPL-SCNC: 26.2 MMOL/L (ref 22–29)
CREAT SERPL-MCNC: 0.77 MG/DL (ref 0.57–1)
GLOBULIN SER CALC-MCNC: 2.4 GM/DL
GLUCOSE SERPL-MCNC: 84 MG/DL (ref 65–99)
HBA1C MFR BLD: 5.6 % (ref 4.8–5.6)
HDLC SERPL-MCNC: 63 MG/DL (ref 40–60)
INTERPRETATION: NORMAL
LDLC SERPL CALC-MCNC: 71 MG/DL (ref 0–100)
Lab: NORMAL
MICROALBUMIN UR-MCNC: <3 UG/ML
POTASSIUM SERPL-SCNC: 4.4 MMOL/L (ref 3.5–5.2)
PROT SERPL-MCNC: 7.2 G/DL (ref 6–8.5)
SODIUM SERPL-SCNC: 140 MMOL/L (ref 136–145)
T3FREE SERPL-MCNC: 2.8 PG/ML (ref 2–4.4)
T4 FREE SERPL-MCNC: 1.48 NG/DL (ref 0.93–1.7)
T4 SERPL-MCNC: 9.91 MCG/DL (ref 4.5–11.7)
TRIGL SERPL-MCNC: 60 MG/DL (ref 0–150)
TSH SERPL DL<=0.005 MIU/L-ACNC: 2.88 MIU/ML (ref 0.27–4.2)
URATE SERPL-MCNC: 2.5 MG/DL (ref 2.4–5.7)
VLDLC SERPL CALC-MCNC: 12 MG/DL (ref 5–40)

## 2018-11-29 ENCOUNTER — OFFICE VISIT (OUTPATIENT)
Dept: ENDOCRINOLOGY | Age: 53
End: 2018-11-29

## 2018-11-29 VITALS
WEIGHT: 168.8 LBS | BODY MASS INDEX: 28.82 KG/M2 | DIASTOLIC BLOOD PRESSURE: 68 MMHG | SYSTOLIC BLOOD PRESSURE: 114 MMHG | HEIGHT: 64 IN

## 2018-11-29 DIAGNOSIS — K75.81 NASH (NONALCOHOLIC STEATOHEPATITIS): ICD-10-CM

## 2018-11-29 DIAGNOSIS — E78.2 MIXED HYPERLIPIDEMIA: ICD-10-CM

## 2018-11-29 DIAGNOSIS — E03.9 PRIMARY HYPOTHYROIDISM: ICD-10-CM

## 2018-11-29 DIAGNOSIS — IMO0002 UNCONTROLLED TYPE 2 DIABETES MELLITUS WITH COMPLICATION, WITHOUT LONG-TERM CURRENT USE OF INSULIN: Primary | ICD-10-CM

## 2018-11-29 DIAGNOSIS — I10 ESSENTIAL HYPERTENSION: ICD-10-CM

## 2018-11-29 DIAGNOSIS — E03.8 SECONDARY HYPOTHYROIDISM: ICD-10-CM

## 2018-11-29 DIAGNOSIS — E55.9 VITAMIN D DEFICIENCY: ICD-10-CM

## 2018-11-29 PROCEDURE — 99214 OFFICE O/P EST MOD 30 MIN: CPT | Performed by: INTERNAL MEDICINE

## 2018-11-29 RX ORDER — LEVOTHYROXINE SODIUM 0.05 MG/1
50 TABLET ORAL DAILY
Qty: 90 TABLET | Refills: 3 | Status: SHIPPED | OUTPATIENT
Start: 2018-11-29 | End: 2019-05-29 | Stop reason: SDUPTHER

## 2018-11-29 RX ORDER — EMPAGLIFLOZIN AND METFORMIN HYDROCHLORIDE 12.5; 5 MG/1; MG/1
1 TABLET ORAL 2 TIMES DAILY
Qty: 180 TABLET | Refills: 3 | Status: SHIPPED | OUTPATIENT
Start: 2018-11-29 | End: 2019-05-29 | Stop reason: SDUPTHER

## 2018-11-29 RX ORDER — OMEGA-3-ACID ETHYL ESTERS 1 G/1
2 CAPSULE, LIQUID FILLED ORAL 2 TIMES DAILY
Qty: 360 CAPSULE | Refills: 3 | Status: SHIPPED | OUTPATIENT
Start: 2018-11-29 | End: 2019-05-29 | Stop reason: SDUPTHER

## 2018-11-29 RX ORDER — EMPAGLIFLOZIN, METFORMIN HYDROCHLORIDE 25; 1000 MG/1; MG/1
1 TABLET, EXTENDED RELEASE ORAL
Qty: 90 TABLET | Refills: 3 | Status: SHIPPED | OUTPATIENT
Start: 2018-11-29 | End: 2018-11-29

## 2018-11-29 RX ORDER — SITAGLIPTIN 100 MG/1
100 TABLET, FILM COATED ORAL DAILY
Qty: 90 TABLET | Refills: 3 | Status: SHIPPED | OUTPATIENT
Start: 2018-11-29 | End: 2019-02-04 | Stop reason: SDUPTHER

## 2018-11-29 RX ORDER — PIOGLITAZONEHYDROCHLORIDE 30 MG/1
30 TABLET ORAL DAILY
Qty: 90 TABLET | Refills: 3 | Status: SHIPPED | OUTPATIENT
Start: 2018-11-29 | End: 2019-05-29 | Stop reason: SDUPTHER

## 2018-11-29 RX ORDER — LISINOPRIL 5 MG/1
5 TABLET ORAL DAILY
Qty: 90 TABLET | Refills: 3 | Status: SHIPPED | OUTPATIENT
Start: 2018-11-29 | End: 2019-05-29

## 2018-11-29 RX ORDER — ERGOCALCIFEROL 1.25 MG/1
50000 CAPSULE ORAL WEEKLY
Qty: 13 CAPSULE | Refills: 3 | Status: SHIPPED | OUTPATIENT
Start: 2018-11-29 | End: 2019-05-29 | Stop reason: SDUPTHER

## 2018-11-29 RX ORDER — ROSUVASTATIN CALCIUM 20 MG/1
20 TABLET, COATED ORAL NIGHTLY
Qty: 90 TABLET | Refills: 3 | Status: SHIPPED | OUTPATIENT
Start: 2018-11-29 | End: 2019-05-29 | Stop reason: SDUPTHER

## 2018-11-29 NOTE — PROGRESS NOTES
Renuka   Maru Martinez is a 53 y.o. female here for follow up for type 2 DM. Patient is not testing her blood glucose at all.   This is a 53-year-old female known patient with type II diabetes hypertension and dyslipidemia as well as hypothyroidism and vitamin D deficiency.  Over the course of the last 6 months she has had no significant health problems for which to go to the emergency room or hospital.  Diabetes   She presents for her follow-up diabetic visit. She has type 2 diabetes mellitus. Hypoglycemia symptoms include nervousness/anxiousness. Associated symptoms include fatigue.       The following portions of the patient's history were reviewed and updated as appropriate: current medications, past family history, past medical history, past social history, past surgical history and problem list.    Review of Systems   Constitutional: Positive for fatigue.   HENT: Negative for trouble swallowing.    Eyes: Negative for visual disturbance.   Respiratory: Negative for cough.    Cardiovascular: Positive for palpitations.   Gastrointestinal: Negative for constipation.   Endocrine: Positive for heat intolerance.   Genitourinary: Negative for difficulty urinating.   Musculoskeletal: Negative for myalgias.   Skin: Negative for rash.   Allergic/Immunologic: Negative.    Neurological: Negative for light-headedness.   Hematological: Bruises/bleeds easily.   Psychiatric/Behavioral: The patient is nervous/anxious.        Objective   Physical Exam   Constitutional: She is oriented to person, place, and time. She appears well-developed and well-nourished. No distress.   HENT:   Head: Normocephalic and atraumatic.   Right Ear: External ear normal.   Left Ear: External ear normal.   Nose: Nose normal.   Mouth/Throat: Oropharynx is clear and moist. No oropharyngeal exudate.   Eyes: Conjunctivae and EOM are normal. Pupils are equal, round, and reactive to light. Right eye exhibits no discharge. Left eye exhibits no discharge.  No scleral icterus.   Neck: Normal range of motion. Neck supple. No JVD present. No tracheal deviation present. No thyromegaly present.   Cardiovascular: Normal rate, regular rhythm, normal heart sounds and intact distal pulses. Exam reveals no gallop and no friction rub.   No murmur heard.  Pulmonary/Chest: Effort normal and breath sounds normal. No stridor. No respiratory distress. She has no wheezes. She has no rales. She exhibits no tenderness.   Abdominal: Soft. Bowel sounds are normal. She exhibits no distension and no mass. There is no tenderness. There is no rebound and no guarding. No hernia.   Musculoskeletal: Normal range of motion. She exhibits no edema, tenderness or deformity.   Lymphadenopathy:     She has no cervical adenopathy.   Neurological: She is alert and oriented to person, place, and time. She has normal reflexes. She displays normal reflexes. No cranial nerve deficit or sensory deficit. She exhibits normal muscle tone. Coordination normal.   Skin: Skin is warm and dry. No rash noted. She is not diaphoretic. No erythema. No pallor.   Psychiatric: She has a normal mood and affect. Her behavior is normal. Judgment and thought content normal.   Nursing note and vitals reviewed.        Assessment/Plan   Maru was seen today for follow-up.    Diagnoses and all orders for this visit:    Uncontrolled type 2 diabetes mellitus with complication, without long-term current use of insulin (CMS/[facility])  -     JANUVIA 100 MG tablet; Take 1 tablet by mouth Daily.  -     pioglitazone (ACTOS) 30 MG tablet; Take 1 tablet by mouth Daily.  -     lisinopril (PRINIVIL,ZESTRIL) 5 MG tablet; Take 1 tablet by mouth Daily.  -     T3, Free; Future  -     T4 & TSH ([facility]); Future  -     T4, Free; Future  -     Uric Acid; Future  -     Vitamin D 25 Hydroxy; Future  -     Comprehensive Metabolic Panel; Future  -     C-Peptide; Future  -     Follicle Stimulating Hormone; Future  -     Hemoglobin A1c; Future  -     Lipid  Panel; Future  -     Luteinizing Hormone; Future  -     MicroAlbumin, Urine, Random - Urine, Clean Catch; Future    Mixed hyperlipidemia  -     rosuvastatin (CRESTOR) 20 MG tablet; Take 1 tablet by mouth Every Night.  -     omega-3 acid ethyl esters (LOVAZA) 1 g capsule; Take 2 capsules by mouth 2 (Two) Times a Day.  -     T3, Free; Future  -     T4 & TSH ([facility]); Future  -     T4, Free; Future  -     Uric Acid; Future  -     Vitamin D 25 Hydroxy; Future  -     Comprehensive Metabolic Panel; Future  -     C-Peptide; Future  -     Follicle Stimulating Hormone; Future  -     Hemoglobin A1c; Future  -     Lipid Panel; Future  -     Luteinizing Hormone; Future  -     MicroAlbumin, Urine, Random - Urine, Clean Catch; Future    PARKER (nonalcoholic steatohepatitis)  -     T3, Free; Future  -     T4 & TSH ([facility]); Future  -     T4, Free; Future  -     Uric Acid; Future  -     Vitamin D 25 Hydroxy; Future  -     Comprehensive Metabolic Panel; Future  -     C-Peptide; Future  -     Follicle Stimulating Hormone; Future  -     Hemoglobin A1c; Future  -     Lipid Panel; Future  -     Luteinizing Hormone; Future  -     MicroAlbumin, Urine, Random - Urine, Clean Catch; Future    Essential hypertension  -     T3, Free; Future  -     T4 & TSH ([facility]); Future  -     T4, Free; Future  -     Uric Acid; Future  -     Vitamin D 25 Hydroxy; Future  -     Comprehensive Metabolic Panel; Future  -     C-Peptide; Future  -     Follicle Stimulating Hormone; Future  -     Hemoglobin A1c; Future  -     Lipid Panel; Future  -     Luteinizing Hormone; Future  -     MicroAlbumin, Urine, Random - Urine, Clean Catch; Future    Vitamin D deficiency  -     vitamin D (ERGOCALCIFEROL) 79973 units capsule capsule; Take 1 capsule by mouth 1 (One) Time Per Week.  -     T3, Free; Future  -     T4 & TSH ([facility]); Future  -     T4, Free; Future  -     Uric Acid; Future  -     Vitamin D 25 Hydroxy; Future  -     Comprehensive Metabolic Panel; Future  -      C-Peptide; Future  -     Follicle Stimulating Hormone; Future  -     Hemoglobin A1c; Future  -     Lipid Panel; Future  -     Luteinizing Hormone; Future  -     MicroAlbumin, Urine, Random - Urine, Clean Catch; Future    Primary hypothyroidism  -     T3, Free; Future  -     T4 & TSH ([facility]); Future  -     T4, Free; Future  -     Uric Acid; Future  -     Vitamin D 25 Hydroxy; Future  -     Comprehensive Metabolic Panel; Future  -     C-Peptide; Future  -     Follicle Stimulating Hormone; Future  -     Hemoglobin A1c; Future  -     Lipid Panel; Future  -     Luteinizing Hormone; Future  -     MicroAlbumin, Urine, Random - Urine, Clean Catch; Future    Secondary hypothyroidism  -     levothyroxine (SYNTHROID) 50 MCG tablet; Take 1 tablet by mouth Daily.  on an empty stomach  -     T3, Free; Future  -     T4 & TSH ([facility]); Future  -     T4, Free; Future  -     Uric Acid; Future  -     Vitamin D 25 Hydroxy; Future  -     Comprehensive Metabolic Panel; Future  -     C-Peptide; Future  -     Follicle Stimulating Hormone; Future  -     Hemoglobin A1c; Future  -     Lipid Panel; Future  -     Luteinizing Hormone; Future  -     MicroAlbumin, Urine, Random - Urine, Clean Catch; Future    Other orders  -     SYNJARDY XR  MG tablet sustained-release 24 hour; Take 1 tablet by mouth Daily With Breakfast.             in summary I saw and examined this 53-year-old female for above-mentioned problems.  I reviewed her laboratory evaluations of the November 15 and provided her with a hard copy of it.  Overall she is clinically and metabolically stable and therefore we will go ahead and continue all her current prescriptions.  She will see Ms. Tanika Melara in 6 months or sooner if needed with laboratory evaluation prior to each office visit.

## 2019-02-04 DIAGNOSIS — IMO0002 UNCONTROLLED TYPE 2 DIABETES MELLITUS WITH COMPLICATION, WITHOUT LONG-TERM CURRENT USE OF INSULIN: ICD-10-CM

## 2019-02-04 RX ORDER — SITAGLIPTIN 100 MG/1
100 TABLET, FILM COATED ORAL DAILY
Qty: 90 TABLET | Refills: 3 | Status: SHIPPED | OUTPATIENT
Start: 2019-02-04 | End: 2019-05-29 | Stop reason: SDUPTHER

## 2019-05-10 ENCOUNTER — LAB (OUTPATIENT)
Dept: ENDOCRINOLOGY | Age: 54
End: 2019-05-10

## 2019-05-10 DIAGNOSIS — E55.9 VITAMIN D DEFICIENCY: ICD-10-CM

## 2019-05-10 DIAGNOSIS — E78.2 MIXED HYPERLIPIDEMIA: Primary | ICD-10-CM

## 2019-05-10 DIAGNOSIS — E78.2 MIXED HYPERLIPIDEMIA: ICD-10-CM

## 2019-05-10 DIAGNOSIS — E03.9 PRIMARY HYPOTHYROIDISM: ICD-10-CM

## 2019-05-10 DIAGNOSIS — IMO0002 UNCONTROLLED TYPE 2 DIABETES MELLITUS WITH COMPLICATION, WITHOUT LONG-TERM CURRENT USE OF INSULIN: ICD-10-CM

## 2019-05-11 LAB
25(OH)D3+25(OH)D2 SERPL-MCNC: 43.3 NG/ML (ref 30–100)
ALBUMIN SERPL-MCNC: 4.6 G/DL (ref 3.5–5.2)
ALBUMIN/GLOB SERPL: 2.2 G/DL
ALP SERPL-CCNC: 40 U/L (ref 39–117)
ALT SERPL-CCNC: 18 U/L (ref 1–33)
AST SERPL-CCNC: 12 U/L (ref 1–32)
BILIRUB SERPL-MCNC: 1 MG/DL (ref 0.2–1.2)
BUN SERPL-MCNC: 14 MG/DL (ref 6–20)
BUN/CREAT SERPL: 21.9 (ref 7–25)
C PEPTIDE SERPL-MCNC: 2.8 NG/ML (ref 1.1–4.4)
CALCIUM SERPL-MCNC: 9.9 MG/DL (ref 8.6–10.5)
CHLORIDE SERPL-SCNC: 102 MMOL/L (ref 98–107)
CHOLEST SERPL-MCNC: 130 MG/DL (ref 0–200)
CO2 SERPL-SCNC: 25.2 MMOL/L (ref 22–29)
CREAT SERPL-MCNC: 0.64 MG/DL (ref 0.57–1)
FT4I SERPL CALC-MCNC: 2.5 (ref 1.2–4.9)
GLOBULIN SER CALC-MCNC: 2.1 GM/DL
GLUCOSE SERPL-MCNC: 102 MG/DL (ref 65–99)
HBA1C MFR BLD: 5.9 % (ref 4.8–5.6)
HDLC SERPL-MCNC: 59 MG/DL (ref 40–60)
INTERPRETATION: NORMAL
LDLC SERPL CALC-MCNC: 59 MG/DL (ref 0–100)
Lab: NORMAL
MICROALBUMIN UR-MCNC: 5.1 UG/ML
POTASSIUM SERPL-SCNC: 4.5 MMOL/L (ref 3.5–5.2)
PROT SERPL-MCNC: 6.7 G/DL (ref 6–8.5)
SODIUM SERPL-SCNC: 140 MMOL/L (ref 136–145)
T3FREE SERPL-MCNC: 2.9 PG/ML (ref 2–4.4)
T3RU NFR SERPL: 25 % (ref 24–39)
T4 FREE SERPL-MCNC: 1.48 NG/DL (ref 0.93–1.7)
T4 SERPL-MCNC: 9.9 UG/DL (ref 4.5–12)
TRIGL SERPL-MCNC: 62 MG/DL (ref 0–150)
TSH SERPL DL<=0.005 MIU/L-ACNC: 1.77 UIU/ML (ref 0.45–4.5)
VLDLC SERPL CALC-MCNC: 12.4 MG/DL

## 2019-05-15 ENCOUNTER — RESULTS ENCOUNTER (OUTPATIENT)
Dept: ENDOCRINOLOGY | Age: 54
End: 2019-05-15

## 2019-05-15 DIAGNOSIS — E03.8 SECONDARY HYPOTHYROIDISM: ICD-10-CM

## 2019-05-15 DIAGNOSIS — E55.9 VITAMIN D DEFICIENCY: ICD-10-CM

## 2019-05-15 DIAGNOSIS — IMO0002 UNCONTROLLED TYPE 2 DIABETES MELLITUS WITH COMPLICATION, WITHOUT LONG-TERM CURRENT USE OF INSULIN: ICD-10-CM

## 2019-05-15 DIAGNOSIS — E78.2 MIXED HYPERLIPIDEMIA: ICD-10-CM

## 2019-05-15 DIAGNOSIS — I10 ESSENTIAL HYPERTENSION: ICD-10-CM

## 2019-05-15 DIAGNOSIS — K75.81 NASH (NONALCOHOLIC STEATOHEPATITIS): ICD-10-CM

## 2019-05-15 DIAGNOSIS — E03.9 PRIMARY HYPOTHYROIDISM: ICD-10-CM

## 2019-05-29 ENCOUNTER — OFFICE VISIT (OUTPATIENT)
Dept: ENDOCRINOLOGY | Age: 54
End: 2019-05-29

## 2019-05-29 VITALS
BODY MASS INDEX: 30.39 KG/M2 | SYSTOLIC BLOOD PRESSURE: 122 MMHG | WEIGHT: 178 LBS | DIASTOLIC BLOOD PRESSURE: 68 MMHG | HEIGHT: 64 IN

## 2019-05-29 DIAGNOSIS — E03.9 PRIMARY HYPOTHYROIDISM: ICD-10-CM

## 2019-05-29 DIAGNOSIS — IMO0002 UNCONTROLLED TYPE 2 DIABETES MELLITUS WITH COMPLICATION, WITHOUT LONG-TERM CURRENT USE OF INSULIN: Primary | ICD-10-CM

## 2019-05-29 DIAGNOSIS — I10 ESSENTIAL HYPERTENSION: ICD-10-CM

## 2019-05-29 DIAGNOSIS — E03.8 SECONDARY HYPOTHYROIDISM: ICD-10-CM

## 2019-05-29 DIAGNOSIS — E78.2 MIXED HYPERLIPIDEMIA: ICD-10-CM

## 2019-05-29 DIAGNOSIS — E55.9 VITAMIN D DEFICIENCY: ICD-10-CM

## 2019-05-29 DIAGNOSIS — K75.81 NASH (NONALCOHOLIC STEATOHEPATITIS): ICD-10-CM

## 2019-05-29 PROCEDURE — 99214 OFFICE O/P EST MOD 30 MIN: CPT | Performed by: NURSE PRACTITIONER

## 2019-05-29 RX ORDER — PIOGLITAZONEHYDROCHLORIDE 30 MG/1
30 TABLET ORAL DAILY
Qty: 90 TABLET | Refills: 3 | Status: SHIPPED | OUTPATIENT
Start: 2019-05-29 | End: 2019-10-04 | Stop reason: SDUPTHER

## 2019-05-29 RX ORDER — LEVOTHYROXINE SODIUM 0.05 MG/1
50 TABLET ORAL DAILY
Qty: 90 TABLET | Refills: 3 | Status: SHIPPED | OUTPATIENT
Start: 2019-05-29 | End: 2019-10-04 | Stop reason: SDUPTHER

## 2019-05-29 RX ORDER — EMPAGLIFLOZIN AND METFORMIN HYDROCHLORIDE 12.5; 5 MG/1; MG/1
1 TABLET ORAL 2 TIMES DAILY
Qty: 180 TABLET | Refills: 3 | Status: SHIPPED | OUTPATIENT
Start: 2019-05-29 | End: 2019-10-04 | Stop reason: SDUPTHER

## 2019-05-29 RX ORDER — ROSUVASTATIN CALCIUM 20 MG/1
20 TABLET, COATED ORAL NIGHTLY
Qty: 90 TABLET | Refills: 3 | Status: SHIPPED | OUTPATIENT
Start: 2019-05-29 | End: 2019-10-04 | Stop reason: SDUPTHER

## 2019-05-29 RX ORDER — OMEGA-3-ACID ETHYL ESTERS 1 G/1
2 CAPSULE, LIQUID FILLED ORAL 2 TIMES DAILY
Qty: 360 CAPSULE | Refills: 3 | Status: SHIPPED | OUTPATIENT
Start: 2019-05-29 | End: 2019-10-04 | Stop reason: SDUPTHER

## 2019-05-29 RX ORDER — ERGOCALCIFEROL 1.25 MG/1
50000 CAPSULE ORAL WEEKLY
Qty: 13 CAPSULE | Refills: 3 | Status: SHIPPED | OUTPATIENT
Start: 2019-05-29 | End: 2019-10-04 | Stop reason: SDUPTHER

## 2019-05-29 RX ORDER — SITAGLIPTIN 100 MG/1
100 TABLET, FILM COATED ORAL DAILY
Qty: 90 TABLET | Refills: 3 | Status: SHIPPED | OUTPATIENT
Start: 2019-05-29 | End: 2019-10-04 | Stop reason: SDUPTHER

## 2019-05-29 RX ORDER — LOSARTAN POTASSIUM 25 MG/1
25 TABLET ORAL DAILY
Qty: 30 TABLET | Refills: 4 | Status: SHIPPED | OUTPATIENT
Start: 2019-05-29 | End: 2019-10-04 | Stop reason: SDUPTHER

## 2019-05-29 NOTE — PROGRESS NOTES
Maru Martinez  presents to the office  for the follow-up appointment for Type 2 diabetes mellitus.     The diabete's condition location is throughout the system with the  clinical course has fluctuated, the severity is Mild, and the modifying/allievating factors are oral medications.  Medications and  Dosages were  reviewed with Maru Martinez and suggested that compliance most of the time.    The patient reports associated symptoms of hyperglycemia have been flushed and associated symptoms of hypoglycemia have been tingling in hands and feet, with their  hypoglycemia threshold for symptoms is 70 mg/dl .     The patient is currently on oral medications .      Compliance with blood glucose monitoring: poor.     Meal panning: The patient is using avoidance of concentrated sweets.    The patient is currently taking home blood tests - Blood glucose testin times daily, that are:    Last instructions given were:  in summary I saw and examined this 53-year-old female for above-mentioned problems.  I reviewed her laboratory evaluations of the November 15 and provided her with a hard copy of it.  Overall she is clinically and metabolically stable and therefore we will go ahead and continue all her current prescriptions.  She will see Ms. Tanikaandrzej Melara in 6 months or sooner if needed with laboratory evaluation prior to each office visit.    Home blood glucose testing daily: 0    Last reported blood glucose readings are:  None.    Patterns reported per patient are none.         The following portions of the patient's history were reviewed and updated as appropriate: current medications, past family history, past medical history, past social history, past surgical history and problem list.      Current Outpatient Medications:   •  glucose blood (FREESTYLE LITE) test strip, Test blood glucose 3 times daily, Disp: 300 each, Rfl: 2  •  JANUVIA 100 MG tablet, Take 1 tablet by mouth Daily., Disp: 90 tablet, Rfl: 3  •   "levothyroxine (SYNTHROID) 50 MCG tablet, Take 1 tablet by mouth Daily.  on an empty stomach, Disp: 90 tablet, Rfl: 3  •  omega-3 acid ethyl esters (LOVAZA) 1 g capsule, Take 2 capsules by mouth 2 (Two) Times a Day., Disp: 360 capsule, Rfl: 3  •  pioglitazone (ACTOS) 30 MG tablet, Take 1 tablet by mouth Daily., Disp: 90 tablet, Rfl: 3  •  rosuvastatin (CRESTOR) 20 MG tablet, Take 1 tablet by mouth Every Night., Disp: 90 tablet, Rfl: 3  •  SYNJARDY 12.5-500 MG tablet, Take 1 tablet by mouth 2 (Two) Times a Day., Disp: 180 tablet, Rfl: 3  •  vitamin D (ERGOCALCIFEROL) 64332 units capsule capsule, Take 1 capsule by mouth 1 (One) Time Per Week., Disp: 13 capsule, Rfl: 3  •  losartan (COZAAR) 25 MG tablet, Take 1 tablet by mouth Daily., Disp: 30 tablet, Rfl: 4    Patient Active Problem List    Diagnosis   • Primary hypothyroidism [E03.9]   • Vitamin D deficiency [E55.9]   • Essential hypertension [I10]   • Beta 0 thalassemia (CMS/HCC) [D56.8]   • Uncontrolled type 2 diabetes mellitus with complication, without long-term current use of insulin (CMS/HCC) [E11.8, E11.65]   • Mixed hyperlipidemia [E78.2]   • PARKER (nonalcoholic steatohepatitis) [K75.81]       Review of Systems   A comprehensive review of the 14 systems was negative except of listed below:  Respiratory: positive for chronic cough - tinkling        Objective:     Wt Readings from Last 3 Encounters:   05/29/19 80.7 kg (178 lb)   11/29/18 76.6 kg (168 lb 12.8 oz)   07/25/18 73 kg (161 lb)     Temp Readings from Last 3 Encounters:   No data found for Temp     BP Readings from Last 3 Encounters:   05/29/19 122/68   11/29/18 114/68   07/25/18 122/78     Pulse Readings from Last 3 Encounters:   No data found for Pulse        /68   Ht 162.6 cm (64.02\")   Wt 80.7 kg (178 lb)   BMI 30.54 kg/m²        Physical Exam   Constitutional: She is oriented to person, place, and time. She appears well-developed and well-nourished. No distress.   HENT:   Head: " Normocephalic and atraumatic.   Eyes: EOM are normal. Pupils are equal, round, and reactive to light.   Neck: Normal range of motion. Neck supple. No thyromegaly present.   Cardiovascular: Normal rate, regular rhythm, normal heart sounds and intact distal pulses.   No murmur heard.  Pulmonary/Chest: Effort normal and breath sounds normal.   Abdominal: Soft. Bowel sounds are normal.   Musculoskeletal: Normal range of motion.   Neurological: She is alert and oriented to person, place, and time.   Skin: Skin is warm and dry. Capillary refill takes 2 to 3 seconds. She is not diaphoretic.   Psychiatric: She has a normal mood and affect. Her behavior is normal. Judgment and thought content normal.   Nursing note and vitals reviewed.          Lab Review  Results for orders placed or performed in visit on 05/10/19   Thyroid Panel With TSH   Result Value Ref Range    TSH 1.770 0.450 - 4.500 uIU/mL    T4, Total 9.9 4.5 - 12.0 ug/dL    T3 Uptake 25 24 - 39 %    Free Thyroxine Index 2.5 1.2 - 4.9   T3, Free   Result Value Ref Range    T3, Free 2.9 2.0 - 4.4 pg/mL   T4, Free   Result Value Ref Range    Free T4 1.48 0.93 - 1.70 ng/dL   Vitamin D 25 Hydroxy   Result Value Ref Range    25 Hydroxy, Vitamin D 43.3 30.0 - 100.0 ng/ml   MicroAlbumin, Urine, Random - Urine, Clean Catch   Result Value Ref Range    Microalbumin, Urine 5.1 Not Estab. ug/mL   Hemoglobin A1c   Result Value Ref Range    Hemoglobin A1C 5.90 (H) 4.80 - 5.60 %   C-Peptide   Result Value Ref Range    C-Peptide 2.8 1.1 - 4.4 ng/mL   Comprehensive Metabolic Panel   Result Value Ref Range    Glucose 102 (H) 65 - 99 mg/dL    BUN 14 6 - 20 mg/dL    Creatinine 0.64 0.57 - 1.00 mg/dL    eGFR Non African Am 98 >60 mL/min/1.73    eGFR African Am 119 >60 mL/min/1.73    BUN/Creatinine Ratio 21.9 7.0 - 25.0    Sodium 140 136 - 145 mmol/L    Potassium 4.5 3.5 - 5.2 mmol/L    Chloride 102 98 - 107 mmol/L    Total CO2 25.2 22.0 - 29.0 mmol/L    Calcium 9.9 8.6 - 10.5 mg/dL     Total Protein 6.7 6.0 - 8.5 g/dL    Albumin 4.60 3.50 - 5.20 g/dL    Globulin 2.1 gm/dL    A/G Ratio 2.2 g/dL    Total Bilirubin 1.0 0.2 - 1.2 mg/dL    Alkaline Phosphatase 40 39 - 117 U/L    AST (SGOT) 12 1 - 32 U/L    ALT (SGPT) 18 1 - 33 U/L   Lipid Panel   Result Value Ref Range    Total Cholesterol 130 0 - 200 mg/dL    Triglycerides 62 0 - 150 mg/dL    HDL Cholesterol 59 40 - 60 mg/dL    VLDL Cholesterol 12.4 mg/dL    LDL Cholesterol  59 0 - 100 mg/dL   Cardiovascular Risk Assessment   Result Value Ref Range    Interpretation Note    Diabetes Patient Education   Result Value Ref Range    PDF Image Not applicable            Assessment:   Maru was seen today for follow-up.    Diagnoses and all orders for this visit:    Uncontrolled type 2 diabetes mellitus with complication, without long-term current use of insulin (CMS/[facility])  -     pioglitazone (ACTOS) 30 MG tablet; Take 1 tablet by mouth Daily.  -     losartan (COZAAR) 25 MG tablet; Take 1 tablet by mouth Daily.  -     JANUVIA 100 MG tablet; Take 1 tablet by mouth Daily.  -     SYNJARDY 12.5-500 MG tablet; Take 1 tablet by mouth 2 (Two) Times a Day.  -     C-Peptide; Future  -     Comprehensive Metabolic Panel; Future  -     Hemoglobin A1c; Future  -     Insulin, Total; Future  -     Lipid Panel; Future  -     Microalbumin / Creatinine Urine Ratio - Urine, Clean Catch; Future  -     Uric Acid; Future  -     Vitamin D 25 Hydroxy; Future  -     TSH; Future  -     T4, Free; Future  -     Thyroid Antibodies; Future    Mixed hyperlipidemia  -     rosuvastatin (CRESTOR) 20 MG tablet; Take 1 tablet by mouth Every Night.  -     omega-3 acid ethyl esters (LOVAZA) 1 g capsule; Take 2 capsules by mouth 2 (Two) Times a Day.  -     Comprehensive Metabolic Panel; Future  -     Lipid Panel; Future    PARKER (nonalcoholic steatohepatitis)  -     Comprehensive Metabolic Panel; Future    Essential hypertension  -     Comprehensive Metabolic Panel; Future    Vitamin D  deficiency  -     vitamin D (ERGOCALCIFEROL) 86382 units capsule capsule; Take 1 capsule by mouth 1 (One) Time Per Week.  -     Comprehensive Metabolic Panel; Future  -     Vitamin D 25 Hydroxy; Future    Primary hypothyroidism  -     Comprehensive Metabolic Panel; Future    Secondary hypothyroidism  -     levothyroxine (SYNTHROID) 50 MCG tablet; Take 1 tablet by mouth Daily.  on an empty stomach  -     Comprehensive Metabolic Panel; Future  -     TSH; Future  -     T4, Free; Future  -     Thyroid Antibodies; Future          Plan:   In summary/ Medication changes: I met with this patient is metabolically stable and doing well at this time.  Laboratory testing was reviewed dated 5.10.2019, discussed with questions and answers completed.  Discussed and formulate a treatment plan with patient, patient verbally stated understood all instructions.    1. Diagnoses and all orders for this visit:    Uncontrolled type 2 diabetes mellitus with complication, without long-term current use of insulin (CMS/[facility])- chronic, stable no medication changes at this time. Refills prescribed. Future labs ordered for upcoming appointment and assessment.       Mixed hyperlipidemia- chronic, stable no medication changes at this time. Refills prescribed. Future labs ordered for upcoming appointment and assessment.        Essential hypertension- chronic, stable no medication changes at this time. Refills prescribed. Future labs ordered for upcoming appointment and assessment.       Vitamin D deficiency- chronic, stable no medication changes at this time. Refills prescribed. Future labs ordered for upcoming appointment and assessment.       Primary hypothyroidism- chronic, stable no medication changes at this time. Refills prescribed. Future labs ordered for upcoming appointment and assessment.         instructions  home blood tests -  Blood glucose testin-2 times daily, that are:  fasting- 1st thing in morning before eating or  drinking  before each meal and 1 or 2 hours after meal  bedtime  anytime you feel symptoms of hyperglycemia or hypoglycemia (high or low blood sugars)        Education:  interpretation of lab results, blood sugar goals, complications of diabetes mellitus, hypoglycemia prevention and treatment, exercise, illness management, self-monitoring of blood glucose skills, nutrition, carbohydrate counting and site rotation        The total face to face time spent was  24 minutes  with additional education given: 14 minutes (greater than 50% of the total time) was spent with counseling and coordination of care on: SMBG with goals, Side effects profiles with medications, medication use and purposes,    Return in about 4 months (around 9/29/2019), or if symptoms worsen or fail to improve, for Recheck.- 2 weeks prior for labs      Dragon transcription disclaimer     Much of this encounter note is an electronic transcription/translation of spoken language to printed text. The electronic translation of spoken language may permit erroneous, or at times, nonsensical words or phrases to be inadvertently transcribed. Although I have reviewed the note for such errors, some may still exist.

## 2019-08-29 ENCOUNTER — RESULTS ENCOUNTER (OUTPATIENT)
Dept: ENDOCRINOLOGY | Age: 54
End: 2019-08-29

## 2019-08-29 DIAGNOSIS — E55.9 VITAMIN D DEFICIENCY: ICD-10-CM

## 2019-08-29 DIAGNOSIS — E03.8 SECONDARY HYPOTHYROIDISM: ICD-10-CM

## 2019-08-29 DIAGNOSIS — IMO0002 UNCONTROLLED TYPE 2 DIABETES MELLITUS WITH COMPLICATION, WITHOUT LONG-TERM CURRENT USE OF INSULIN: ICD-10-CM

## 2019-08-29 DIAGNOSIS — I10 ESSENTIAL HYPERTENSION: ICD-10-CM

## 2019-08-29 DIAGNOSIS — E03.9 PRIMARY HYPOTHYROIDISM: ICD-10-CM

## 2019-08-29 DIAGNOSIS — E78.2 MIXED HYPERLIPIDEMIA: ICD-10-CM

## 2019-08-29 DIAGNOSIS — K75.81 NASH (NONALCOHOLIC STEATOHEPATITIS): ICD-10-CM

## 2019-09-23 LAB
25(OH)D3+25(OH)D2 SERPL-MCNC: 34.3 NG/ML (ref 30–100)
ALBUMIN SERPL-MCNC: 4.3 G/DL (ref 3.5–5.2)
ALBUMIN/CREAT UR: <5.1 MG/G CREAT (ref 0–30)
ALBUMIN/GLOB SERPL: 2.3 G/DL
ALP SERPL-CCNC: 39 U/L (ref 39–117)
ALT SERPL-CCNC: 18 U/L (ref 1–33)
AST SERPL-CCNC: 13 U/L (ref 1–32)
BILIRUB SERPL-MCNC: 1 MG/DL (ref 0.2–1.2)
BUN SERPL-MCNC: 13 MG/DL (ref 6–20)
BUN/CREAT SERPL: 19.1 (ref 7–25)
C PEPTIDE SERPL-MCNC: 2.6 NG/ML (ref 1.1–4.4)
CALCIUM SERPL-MCNC: 8.9 MG/DL (ref 8.6–10.5)
CHLORIDE SERPL-SCNC: 103 MMOL/L (ref 98–107)
CHOLEST SERPL-MCNC: 116 MG/DL (ref 0–200)
CO2 SERPL-SCNC: 27.1 MMOL/L (ref 22–29)
CREAT SERPL-MCNC: 0.68 MG/DL (ref 0.57–1)
CREAT UR-MCNC: 58.4 MG/DL
GLOBULIN SER CALC-MCNC: 1.9 GM/DL
GLUCOSE SERPL-MCNC: 89 MG/DL (ref 65–99)
HBA1C MFR BLD: 6 % (ref 4.8–5.6)
HDLC SERPL-MCNC: 57 MG/DL (ref 40–60)
INSULIN SERPL-ACNC: 10.8 UIU/ML (ref 2.6–24.9)
INTERPRETATION: NORMAL
LDLC SERPL CALC-MCNC: 49 MG/DL (ref 0–100)
Lab: NORMAL
MICROALBUMIN UR-MCNC: <3 UG/ML
POTASSIUM SERPL-SCNC: 4.4 MMOL/L (ref 3.5–5.2)
PROT SERPL-MCNC: 6.2 G/DL (ref 6–8.5)
SODIUM SERPL-SCNC: 142 MMOL/L (ref 136–145)
T4 FREE SERPL-MCNC: 1.45 NG/DL (ref 0.93–1.7)
THYROGLOB AB SERPL-ACNC: <1 IU/ML (ref 0–0.9)
THYROPEROXIDASE AB SERPL-ACNC: <6 IU/ML (ref 0–34)
TRIGL SERPL-MCNC: 49 MG/DL (ref 0–150)
TSH SERPL DL<=0.005 MIU/L-ACNC: 2.79 UIU/ML (ref 0.27–4.2)
URATE SERPL-MCNC: 2.4 MG/DL (ref 2.4–5.7)
VLDLC SERPL CALC-MCNC: 9.8 MG/DL

## 2019-10-04 ENCOUNTER — OFFICE VISIT (OUTPATIENT)
Dept: ENDOCRINOLOGY | Age: 54
End: 2019-10-04

## 2019-10-04 VITALS
SYSTOLIC BLOOD PRESSURE: 120 MMHG | BODY MASS INDEX: 31.76 KG/M2 | HEIGHT: 64 IN | WEIGHT: 186 LBS | DIASTOLIC BLOOD PRESSURE: 68 MMHG

## 2019-10-04 DIAGNOSIS — E78.2 MIXED HYPERLIPIDEMIA: ICD-10-CM

## 2019-10-04 DIAGNOSIS — E03.8 SECONDARY HYPOTHYROIDISM: ICD-10-CM

## 2019-10-04 DIAGNOSIS — E03.9 PRIMARY HYPOTHYROIDISM: ICD-10-CM

## 2019-10-04 DIAGNOSIS — I10 ESSENTIAL HYPERTENSION: ICD-10-CM

## 2019-10-04 DIAGNOSIS — K75.81 NASH (NONALCOHOLIC STEATOHEPATITIS): ICD-10-CM

## 2019-10-04 DIAGNOSIS — E55.9 VITAMIN D DEFICIENCY: ICD-10-CM

## 2019-10-04 DIAGNOSIS — IMO0002 UNCONTROLLED TYPE 2 DIABETES MELLITUS WITH COMPLICATION, WITHOUT LONG-TERM CURRENT USE OF INSULIN: Primary | ICD-10-CM

## 2019-10-04 PROCEDURE — 99214 OFFICE O/P EST MOD 30 MIN: CPT | Performed by: NURSE PRACTITIONER

## 2019-10-04 RX ORDER — LOSARTAN POTASSIUM 25 MG/1
25 TABLET ORAL DAILY
Qty: 30 TABLET | Refills: 4 | Status: SHIPPED | OUTPATIENT
Start: 2019-10-04 | End: 2020-03-16 | Stop reason: SDUPTHER

## 2019-10-04 RX ORDER — PIOGLITAZONEHYDROCHLORIDE 30 MG/1
30 TABLET ORAL DAILY
Qty: 90 TABLET | Refills: 3 | Status: SHIPPED | OUTPATIENT
Start: 2019-10-04 | End: 2020-03-16 | Stop reason: SDUPTHER

## 2019-10-04 RX ORDER — SITAGLIPTIN 100 MG/1
100 TABLET, FILM COATED ORAL DAILY
Qty: 90 TABLET | Refills: 3 | Status: SHIPPED | OUTPATIENT
Start: 2019-10-04 | End: 2020-03-16 | Stop reason: SDUPTHER

## 2019-10-04 RX ORDER — MONTELUKAST SODIUM 10 MG/1
10 TABLET ORAL DAILY
Refills: 5 | COMMUNITY
Start: 2019-08-20

## 2019-10-04 RX ORDER — OMEGA-3-ACID ETHYL ESTERS 1 G/1
2 CAPSULE, LIQUID FILLED ORAL 2 TIMES DAILY
Qty: 360 CAPSULE | Refills: 3 | Status: SHIPPED | OUTPATIENT
Start: 2019-10-04 | End: 2020-03-16 | Stop reason: SDUPTHER

## 2019-10-04 RX ORDER — EMPAGLIFLOZIN AND METFORMIN HYDROCHLORIDE 12.5; 5 MG/1; MG/1
1 TABLET ORAL 2 TIMES DAILY
Qty: 180 TABLET | Refills: 3 | Status: SHIPPED | OUTPATIENT
Start: 2019-10-04 | End: 2020-03-16 | Stop reason: SDUPTHER

## 2019-10-04 RX ORDER — ERGOCALCIFEROL 1.25 MG/1
50000 CAPSULE ORAL WEEKLY
Qty: 13 CAPSULE | Refills: 3 | Status: SHIPPED | OUTPATIENT
Start: 2019-10-04 | End: 2020-03-16 | Stop reason: SDUPTHER

## 2019-10-04 RX ORDER — LEVOTHYROXINE SODIUM 0.05 MG/1
50 TABLET ORAL DAILY
Qty: 90 TABLET | Refills: 3 | Status: SHIPPED | OUTPATIENT
Start: 2019-10-04 | End: 2020-03-16 | Stop reason: SDUPTHER

## 2019-10-04 RX ORDER — ROSUVASTATIN CALCIUM 20 MG/1
20 TABLET, COATED ORAL NIGHTLY
Qty: 90 TABLET | Refills: 3 | Status: SHIPPED | OUTPATIENT
Start: 2019-10-04 | End: 2020-03-16 | Stop reason: SDUPTHER

## 2019-10-04 NOTE — PROGRESS NOTES
Maru Martinez  presents to the office  for the follow-up appointment for Type 2 diabetes mellitus.     The diabete's condition location is throughout the system with the  clinical course has fluctuated, the severity is Mild, and the modifying/allievating factors are oral medications.  Medications and  Dosages were  reviewed with Maru Martinez and suggested that compliance most of the time.    The patient reports associated symptoms of hyperglycemia have been flushed and associated symptoms of hypoglycemia have been tingling in hands and feet, with their  hypoglycemia threshold for symptoms is 70 mg/dl .     The patient is currently on oral medications .      Compliance with blood glucose monitoring: fair.     Meal panning: The patient is using avoidance of concentrated sweets.    The patient is currently taking home blood tests - Blood glucose testin times daily, that are:    Last instructions given were:  No change.     Home blood glucose testing daily: 0    Last reported blood glucose readings are:  None.    Patterns reported per patient are that she does not test her BS due to getting nauseated when seeing her own blood.          The following portions of the patient's history were reviewed and updated as appropriate: current medications, past family history, past medical history, past social history, past surgical history and problem list.      Current Outpatient Medications:   •  glucose blood (FREESTYLE LITE) test strip, Test blood glucose 3 times daily, Disp: 300 each, Rfl: 2  •  JANUVIA 100 MG tablet, Take 1 tablet by mouth Daily., Disp: 90 tablet, Rfl: 3  •  levothyroxine (SYNTHROID) 50 MCG tablet, Take 1 tablet by mouth Daily.  on an empty stomach, Disp: 90 tablet, Rfl: 3  •  losartan (COZAAR) 25 MG tablet, Take 1 tablet by mouth Daily., Disp: 30 tablet, Rfl: 4  •  montelukast (SINGULAIR) 10 MG tablet, Take 10 mg by mouth Daily., Disp: , Rfl: 5  •  omega-3 acid ethyl esters (LOVAZA) 1 g capsule, Take 2  "capsules by mouth 2 (Two) Times a Day., Disp: 360 capsule, Rfl: 3  •  pioglitazone (ACTOS) 30 MG tablet, Take 1 tablet by mouth Daily., Disp: 90 tablet, Rfl: 3  •  rosuvastatin (CRESTOR) 20 MG tablet, Take 1 tablet by mouth Every Night., Disp: 90 tablet, Rfl: 3  •  SYNJARDY 12.5-500 MG tablet, Take 1 tablet by mouth 2 (Two) Times a Day., Disp: 180 tablet, Rfl: 3  •  vitamin D (ERGOCALCIFEROL) 48362 units capsule capsule, Take 1 capsule by mouth 1 (One) Time Per Week., Disp: 13 capsule, Rfl: 3    Patient Active Problem List    Diagnosis   • Primary hypothyroidism [E03.9]   • Vitamin D deficiency [E55.9]   • Essential hypertension [I10]   • Beta 0 thalassemia (CMS/HCC) [D56.8]   • Uncontrolled type 2 diabetes mellitus with complication, without long-term current use of insulin (CMS/HCC) [E11.8, E11.65]   • Mixed hyperlipidemia [E78.2]   • PARKER (nonalcoholic steatohepatitis) [K75.81]       Review of Systems   A comprehensive review of the 14 systems was negative except of listed below:  Respiratory: positive for chronic cough, PCP treated with resp allergery   Objective:     Wt Readings from Last 3 Encounters:   10/04/19 84.4 kg (186 lb)   05/29/19 80.7 kg (178 lb)   11/29/18 76.6 kg (168 lb 12.8 oz)     Temp Readings from Last 3 Encounters:   No data found for Temp     BP Readings from Last 3 Encounters:   10/04/19 120/68   05/29/19 122/68   11/29/18 114/68     Pulse Readings from Last 3 Encounters:   No data found for Pulse        /68   Ht 162.6 cm (64.02\")   Wt 84.4 kg (186 lb)   BMI 31.91 kg/m²        Physical Exam   Constitutional: She is oriented to person, place, and time. She appears well-developed and well-nourished. No distress.   HENT:   Head: Normocephalic and atraumatic.   Eyes: EOM are normal. Pupils are equal, round, and reactive to light.   Neck: Normal range of motion. Neck supple. No thyromegaly present.   Cardiovascular: Normal rate, regular rhythm, normal heart sounds and intact distal " pulses.   No murmur heard.  Pulmonary/Chest: Effort normal and breath sounds normal.   Abdominal: Soft. Bowel sounds are normal.   Musculoskeletal: Normal range of motion.   Neurological: She is alert and oriented to person, place, and time.   Skin: Skin is warm and dry. Capillary refill takes 2 to 3 seconds. She is not diaphoretic.   Psychiatric: She has a normal mood and affect. Her behavior is normal. Judgment and thought content normal.   Nursing note and vitals reviewed.          Lab Review  Results for orders placed or performed in visit on 08/29/19   C-Peptide   Result Value Ref Range    C-Peptide 2.6 1.1 - 4.4 ng/mL   Comprehensive Metabolic Panel   Result Value Ref Range    Glucose 89 65 - 99 mg/dL    BUN 13 6 - 20 mg/dL    Creatinine 0.68 0.57 - 1.00 mg/dL    eGFR Non African Am 91 >60 mL/min/1.73    eGFR African Am 110 >60 mL/min/1.73    BUN/Creatinine Ratio 19.1 7.0 - 25.0    Sodium 142 136 - 145 mmol/L    Potassium 4.4 3.5 - 5.2 mmol/L    Chloride 103 98 - 107 mmol/L    Total CO2 27.1 22.0 - 29.0 mmol/L    Calcium 8.9 8.6 - 10.5 mg/dL    Total Protein 6.2 6.0 - 8.5 g/dL    Albumin 4.30 3.50 - 5.20 g/dL    Globulin 1.9 gm/dL    A/G Ratio 2.3 g/dL    Total Bilirubin 1.0 0.2 - 1.2 mg/dL    Alkaline Phosphatase 39 39 - 117 U/L    AST (SGOT) 13 1 - 32 U/L    ALT (SGPT) 18 1 - 33 U/L   Hemoglobin A1c   Result Value Ref Range    Hemoglobin A1C 6.00 (H) 4.80 - 5.60 %   Insulin, Total   Result Value Ref Range    Insulin 10.8 2.6 - 24.9 uIU/mL   Lipid Panel   Result Value Ref Range    Total Cholesterol 116 0 - 200 mg/dL    Triglycerides 49 0 - 150 mg/dL    HDL Cholesterol 57 40 - 60 mg/dL    VLDL Cholesterol 9.8 mg/dL    LDL Cholesterol  49 0 - 100 mg/dL   Microalbumin / Creatinine Urine Ratio - Urine, Clean Catch   Result Value Ref Range    Creatinine, Urine 58.4 Not Estab. mg/dL    Microalbumin, Urine <3.0 Not Estab. ug/mL    Microalbumin/Creatinine Ratio <5.1 0.0 - 30.0 mg/g creat   Uric Acid   Result Value  Ref Range    Uric Acid 2.4 2.4 - 5.7 mg/dL   Vitamin D 25 Hydroxy   Result Value Ref Range    25 Hydroxy, Vitamin D 34.3 30.0 - 100.0 ng/ml   TSH   Result Value Ref Range    TSH 2.790 0.270 - 4.200 uIU/mL   T4, Free   Result Value Ref Range    Free T4 1.45 0.93 - 1.70 ng/dL   Thyroid Antibodies   Result Value Ref Range    Thyroid Peroxidase Antibody <6 0 - 34 IU/mL    Thyroglobulin Ab <1.0 0.0 - 0.9 IU/mL   Cardiovascular Risk Assessment   Result Value Ref Range    Interpretation Note    Diabetes Patient Education   Result Value Ref Range    PDF Image Not applicable            Assessment:   Maru was seen today for follow-up.    Diagnoses and all orders for this visit:    Uncontrolled type 2 diabetes mellitus with complication, without long-term current use of insulin (CMS/[facility])  -     JANUVIA 100 MG tablet; Take 1 tablet by mouth Daily.  -     losartan (COZAAR) 25 MG tablet; Take 1 tablet by mouth Daily.  -     pioglitazone (ACTOS) 30 MG tablet; Take 1 tablet by mouth Daily.  -     SYNJARDY 12.5-500 MG tablet; Take 1 tablet by mouth 2 (Two) Times a Day.  -     Comprehensive Metabolic Panel; Future  -     Hemoglobin A1c; Future  -     Lipid Panel; Future  -     Microalbumin / Creatinine Urine Ratio - Urine, Clean Catch; Future  -     Uric Acid; Future  -     Vitamin D 25 Hydroxy; Future  -     TSH; Future  -     T4, Free; Future  -     Thyroid Antibodies; Future    Mixed hyperlipidemia  -     omega-3 acid ethyl esters (LOVAZA) 1 g capsule; Take 2 capsules by mouth 2 (Two) Times a Day.  -     rosuvastatin (CRESTOR) 20 MG tablet; Take 1 tablet by mouth Every Night.  -     Comprehensive Metabolic Panel; Future  -     Lipid Panel; Future    PARKER (nonalcoholic steatohepatitis)  -     Comprehensive Metabolic Panel; Future    Essential hypertension  -     Comprehensive Metabolic Panel; Future    Primary hypothyroidism  -     Comprehensive Metabolic Panel; Future    Vitamin D deficiency  -     vitamin D (ERGOCALCIFEROL)  82046 units capsule capsule; Take 1 capsule by mouth 1 (One) Time Per Week.  -     Comprehensive Metabolic Panel; Future  -     Vitamin D 25 Hydroxy; Future    Secondary hypothyroidism  -     levothyroxine (SYNTHROID) 50 MCG tablet; Take 1 tablet by mouth Daily.  on an empty stomach  -     Comprehensive Metabolic Panel; Future  -     TSH; Future  -     T4, Free; Future  -     Thyroid Antibodies; Future          Plan:   In summary/ Medication changes: I met with this patient is metabolically stable and doing well at this time.  Laboratory testing was reviewed dated 2019, discussed with questions and answers completed.  Discussed and formulate a treatment plan with patient, patient verbally stated understood all instructions.    1. Diagnoses and all orders for this visit:    Uncontrolled type 2 diabetes mellitus with complication, without long-term current use of insulin (CMS/[facility])- chronic, stable no medication changes at this time. Refills prescribed. Future labs ordered for upcoming appointment and assessment.      Mixed hyperlipidemia- chronic, stable no medication changes at this time. Refills prescribed. Future labs ordered for upcoming appointment and assessment.        Essential hypertension- chronic, stable no medication changes at this time. Refills prescribed. Future labs ordered for upcoming appointment and assessment.      Primary hypothyroidism- chronic, stable no medication changes at this time. Refills prescribed. Future labs ordered for upcoming appointment and assessment.          Vitamin D deficiency- chronic, stable no medication changes at this time. Refills prescribed. Future labs ordered for upcoming appointment and assessment.          instructions    home blood tests -  Blood glucose testin-2 times daily, that are:  fasting- 1st thing in morning before eating or drinking  before each meal and 1 or 2 hours after meal  bedtime  anytime you feel symptoms of hyperglycemia or hypoglycemia  (high or low blood sugars)        Education:  interpretation of lab results, blood sugar goals, complications of diabetes mellitus, hypoglycemia prevention and treatment, exercise, illness management, self-monitoring of blood glucose skills, nutrition and carbohydrate counting        The total face to face time spent was  25   minutes  with additional education given: 14 minutes (greater than 50% of the total time) was spent with counseling and coordination of care on: SMBG with goals, Side effects profiles with medications, medication use and purposes,     Return if symptoms worsen or fail to improve, for Recheck.  4 months with Tanika-2 weeks prior for labs  8 months with Dr. Arndt-2 weeks prior for labs      Dragon transcription disclaimer     Much of this encounter note is an electronic transcription/translation of spoken language to printed text. The electronic translation of spoken language may permit erroneous, or at times, nonsensical words or phrases to be inadvertently transcribed. Although I have reviewed the note for such errors, some may still exist.

## 2020-02-04 ENCOUNTER — RESULTS ENCOUNTER (OUTPATIENT)
Dept: ENDOCRINOLOGY | Age: 55
End: 2020-02-04

## 2020-02-04 DIAGNOSIS — E55.9 VITAMIN D DEFICIENCY: ICD-10-CM

## 2020-02-04 DIAGNOSIS — E03.8 SECONDARY HYPOTHYROIDISM: ICD-10-CM

## 2020-02-04 DIAGNOSIS — K75.81 NASH (NONALCOHOLIC STEATOHEPATITIS): ICD-10-CM

## 2020-02-04 DIAGNOSIS — E03.9 PRIMARY HYPOTHYROIDISM: ICD-10-CM

## 2020-02-04 DIAGNOSIS — IMO0002 UNCONTROLLED TYPE 2 DIABETES MELLITUS WITH COMPLICATION, WITHOUT LONG-TERM CURRENT USE OF INSULIN: ICD-10-CM

## 2020-02-04 DIAGNOSIS — I10 ESSENTIAL HYPERTENSION: ICD-10-CM

## 2020-02-04 DIAGNOSIS — E78.2 MIXED HYPERLIPIDEMIA: ICD-10-CM

## 2020-03-16 DIAGNOSIS — E03.8 SECONDARY HYPOTHYROIDISM: ICD-10-CM

## 2020-03-16 DIAGNOSIS — E78.2 MIXED HYPERLIPIDEMIA: ICD-10-CM

## 2020-03-16 DIAGNOSIS — E55.9 VITAMIN D DEFICIENCY: ICD-10-CM

## 2020-03-16 DIAGNOSIS — IMO0002 UNCONTROLLED TYPE 2 DIABETES MELLITUS WITH COMPLICATION, WITHOUT LONG-TERM CURRENT USE OF INSULIN: ICD-10-CM

## 2020-03-16 RX ORDER — ROSUVASTATIN CALCIUM 20 MG/1
20 TABLET, COATED ORAL NIGHTLY
Qty: 90 TABLET | Refills: 0 | Status: SHIPPED | OUTPATIENT
Start: 2020-03-16 | End: 2020-06-05 | Stop reason: SDUPTHER

## 2020-03-16 RX ORDER — LEVOTHYROXINE SODIUM 0.05 MG/1
50 TABLET ORAL DAILY
Qty: 90 TABLET | Refills: 0 | Status: SHIPPED | OUTPATIENT
Start: 2020-03-16

## 2020-03-16 RX ORDER — LOSARTAN POTASSIUM 25 MG/1
25 TABLET ORAL DAILY
Qty: 30 TABLET | Refills: 0 | Status: SHIPPED | OUTPATIENT
Start: 2020-03-16 | End: 2020-04-15

## 2020-03-16 RX ORDER — EMPAGLIFLOZIN AND METFORMIN HYDROCHLORIDE 12.5; 5 MG/1; MG/1
1 TABLET ORAL 2 TIMES DAILY
Qty: 180 TABLET | Refills: 0 | Status: SHIPPED | OUTPATIENT
Start: 2020-03-16 | End: 2020-06-05 | Stop reason: SDUPTHER

## 2020-03-16 RX ORDER — PIOGLITAZONEHYDROCHLORIDE 30 MG/1
30 TABLET ORAL DAILY
Qty: 90 TABLET | Refills: 0 | Status: SHIPPED | OUTPATIENT
Start: 2020-03-16 | End: 2020-06-05 | Stop reason: SDUPTHER

## 2020-03-16 RX ORDER — ERGOCALCIFEROL 1.25 MG/1
50000 CAPSULE ORAL WEEKLY
Qty: 13 CAPSULE | Refills: 0 | Status: SHIPPED | OUTPATIENT
Start: 2020-03-16 | End: 2020-06-05 | Stop reason: SDUPTHER

## 2020-03-16 RX ORDER — SITAGLIPTIN 100 MG/1
100 TABLET, FILM COATED ORAL DAILY
Qty: 90 TABLET | Refills: 0 | Status: SHIPPED | OUTPATIENT
Start: 2020-03-16 | End: 2020-06-05 | Stop reason: SDUPTHER

## 2020-03-16 RX ORDER — OMEGA-3-ACID ETHYL ESTERS 1 G/1
2 CAPSULE, LIQUID FILLED ORAL 2 TIMES DAILY
Qty: 360 CAPSULE | Refills: 0 | Status: SHIPPED | OUTPATIENT
Start: 2020-03-16 | End: 2020-06-05 | Stop reason: SDUPTHER

## 2020-04-15 DIAGNOSIS — IMO0002 UNCONTROLLED TYPE 2 DIABETES MELLITUS WITH COMPLICATION, WITHOUT LONG-TERM CURRENT USE OF INSULIN: ICD-10-CM

## 2020-04-15 RX ORDER — LOSARTAN POTASSIUM 25 MG/1
TABLET ORAL
Qty: 30 TABLET | Refills: 0 | Status: SHIPPED | OUTPATIENT
Start: 2020-04-15 | End: 2020-06-05 | Stop reason: SDUPTHER

## 2020-05-23 LAB
25(OH)D3+25(OH)D2 SERPL-MCNC: 58.7 NG/ML (ref 30–100)
ALBUMIN SERPL-MCNC: 4.7 G/DL (ref 3.5–5.2)
ALBUMIN/GLOB SERPL: 2.6 G/DL
ALP SERPL-CCNC: 41 U/L (ref 39–117)
ALT SERPL-CCNC: 16 U/L (ref 1–33)
AST SERPL-CCNC: 12 U/L (ref 1–32)
BILIRUB SERPL-MCNC: 0.9 MG/DL (ref 0.2–1.2)
BUN SERPL-MCNC: 12 MG/DL (ref 6–20)
BUN/CREAT SERPL: 17.4 (ref 7–25)
C PEPTIDE SERPL-MCNC: 3.1 NG/ML (ref 1.1–4.4)
CALCIUM SERPL-MCNC: 9.6 MG/DL (ref 8.6–10.5)
CHLORIDE SERPL-SCNC: 103 MMOL/L (ref 98–107)
CHOLEST SERPL-MCNC: 119 MG/DL (ref 0–200)
CO2 SERPL-SCNC: 25.8 MMOL/L (ref 22–29)
CREAT SERPL-MCNC: 0.69 MG/DL (ref 0.57–1)
FSH SERPL-ACNC: 41.7 MIU/ML
GLOBULIN SER CALC-MCNC: 1.8 GM/DL
GLUCOSE SERPL-MCNC: 95 MG/DL (ref 65–99)
HBA1C MFR BLD: 6.2 % (ref 4.8–5.6)
HDLC SERPL-MCNC: 54 MG/DL (ref 40–60)
INTERPRETATION: NORMAL
LDLC SERPL CALC-MCNC: 53 MG/DL (ref 0–100)
LH SERPL-ACNC: 20 MIU/ML
Lab: NORMAL
MICROALBUMIN UR-MCNC: <3 UG/ML
POTASSIUM SERPL-SCNC: 4.5 MMOL/L (ref 3.5–5.2)
PROT SERPL-MCNC: 6.5 G/DL (ref 6–8.5)
SODIUM SERPL-SCNC: 141 MMOL/L (ref 136–145)
T3FREE SERPL-MCNC: 3.1 PG/ML (ref 2–4.4)
T4 FREE SERPL-MCNC: 1.54 NG/DL (ref 0.93–1.7)
T4 SERPL-MCNC: 10.2 MCG/DL (ref 4.5–11.7)
TRIGL SERPL-MCNC: 61 MG/DL (ref 0–150)
TSH SERPL DL<=0.005 MIU/L-ACNC: 2.29 UIU/ML (ref 0.27–4.2)
URATE SERPL-MCNC: 2.2 MG/DL (ref 2.4–5.7)
VLDLC SERPL CALC-MCNC: 12.2 MG/DL (ref 5–40)

## 2020-06-05 ENCOUNTER — OFFICE VISIT (OUTPATIENT)
Dept: ENDOCRINOLOGY | Age: 55
End: 2020-06-05

## 2020-06-05 VITALS
SYSTOLIC BLOOD PRESSURE: 118 MMHG | HEIGHT: 65 IN | WEIGHT: 188.6 LBS | DIASTOLIC BLOOD PRESSURE: 80 MMHG | BODY MASS INDEX: 31.42 KG/M2 | RESPIRATION RATE: 16 BRPM

## 2020-06-05 DIAGNOSIS — E55.9 VITAMIN D DEFICIENCY: ICD-10-CM

## 2020-06-05 DIAGNOSIS — E78.2 MIXED HYPERLIPIDEMIA: ICD-10-CM

## 2020-06-05 DIAGNOSIS — I10 ESSENTIAL HYPERTENSION: ICD-10-CM

## 2020-06-05 DIAGNOSIS — IMO0002 UNCONTROLLED TYPE 2 DIABETES MELLITUS WITH COMPLICATION, WITHOUT LONG-TERM CURRENT USE OF INSULIN: Primary | ICD-10-CM

## 2020-06-05 DIAGNOSIS — E03.9 PRIMARY HYPOTHYROIDISM: ICD-10-CM

## 2020-06-05 DIAGNOSIS — K75.81 NASH (NONALCOHOLIC STEATOHEPATITIS): ICD-10-CM

## 2020-06-05 DIAGNOSIS — IMO0002 UNCONTROLLED TYPE 2 DIABETES MELLITUS WITH COMPLICATION, WITHOUT LONG-TERM CURRENT USE OF INSULIN: ICD-10-CM

## 2020-06-05 PROCEDURE — 99214 OFFICE O/P EST MOD 30 MIN: CPT | Performed by: INTERNAL MEDICINE

## 2020-06-05 RX ORDER — PIOGLITAZONEHYDROCHLORIDE 30 MG/1
30 TABLET ORAL DAILY
Qty: 90 TABLET | Refills: 3 | Status: SHIPPED | OUTPATIENT
Start: 2020-06-05 | End: 2021-06-05

## 2020-06-05 RX ORDER — EMPAGLIFLOZIN AND METFORMIN HYDROCHLORIDE 12.5; 5 MG/1; MG/1
1 TABLET ORAL 2 TIMES DAILY
Qty: 180 TABLET | Refills: 3 | Status: SHIPPED | OUTPATIENT
Start: 2020-06-05

## 2020-06-05 RX ORDER — SITAGLIPTIN 100 MG/1
100 TABLET, FILM COATED ORAL DAILY
Qty: 90 TABLET | Refills: 3 | Status: SHIPPED | OUTPATIENT
Start: 2020-06-05

## 2020-06-05 RX ORDER — OMEGA-3-ACID ETHYL ESTERS 1 G/1
2 CAPSULE, LIQUID FILLED ORAL 2 TIMES DAILY
Qty: 360 CAPSULE | Refills: 3 | Status: SHIPPED | OUTPATIENT
Start: 2020-06-05

## 2020-06-05 RX ORDER — ERGOCALCIFEROL 1.25 MG/1
50000 CAPSULE ORAL WEEKLY
Qty: 13 CAPSULE | Refills: 3 | Status: SHIPPED | OUTPATIENT
Start: 2020-06-05

## 2020-06-05 RX ORDER — ROSUVASTATIN CALCIUM 20 MG/1
20 TABLET, COATED ORAL NIGHTLY
Qty: 90 TABLET | Refills: 3 | Status: SHIPPED | OUTPATIENT
Start: 2020-06-05 | End: 2021-06-05

## 2020-06-05 RX ORDER — LOSARTAN POTASSIUM 25 MG/1
25 TABLET ORAL DAILY
Qty: 90 TABLET | Refills: 0 | Status: SHIPPED | OUTPATIENT
Start: 2020-06-05 | End: 2020-09-03

## 2020-06-05 NOTE — PROGRESS NOTES
"Renuka   Maru Martinez is a 54 y.o. female seen for follow up for Dm2, hypothyroidism, hyperlipidemia, lab review. Patient is not checking BG. She denies any problems or concerns.     History of Present Illness this is a 54-year-old female known patient with type 2 diabetes hypertension and dyslipidemia as well as vitamin D deficiency and obesity with hypothyroidism.  Over the course of last 6 months she has had no significant health problem for which to go to the ER or hospital.    /80   Resp 16   Ht 163.8 cm (64.5\")   Wt 85.5 kg (188 lb 9.6 oz)   BMI 31.87 kg/m²      Allergies   Allergen Reactions   • Cyclobenzaprine Unknown (See Comments)   • Glipizide Other (See Comments)     Drowsiness     • Iodinated Diagnostic Agents Hives     Heart palp   • Lipitor [Atorvastatin] Myalgia   • Zoloft [Sertraline Hcl] Unknown (See Comments)     nausea       Current Outpatient Medications:   •  glucose blood (FREESTYLE LITE) test strip, Test blood glucose 3 times daily, Disp: 300 each, Rfl: 0  •  JANUVIA 100 MG tablet, Take 1 tablet by mouth Daily., Disp: 90 tablet, Rfl: 0  •  levothyroxine (SYNTHROID) 50 MCG tablet, Take 1 tablet by mouth Daily.  on an empty stomach, Disp: 90 tablet, Rfl: 0  •  losartan (COZAAR) 25 MG tablet, TAKE 1 TABLET BY MOUTH EVERY DAY, Disp: 30 tablet, Rfl: 0  •  montelukast (SINGULAIR) 10 MG tablet, Take 10 mg by mouth Daily., Disp: , Rfl: 5  •  omega-3 acid ethyl esters (LOVAZA) 1 g capsule, Take 2 capsules by mouth 2 (Two) Times a Day., Disp: 360 capsule, Rfl: 0  •  pioglitazone (ACTOS) 30 MG tablet, Take 1 tablet by mouth Daily., Disp: 90 tablet, Rfl: 0  •  rosuvastatin (CRESTOR) 20 MG tablet, Take 1 tablet by mouth Every Night., Disp: 90 tablet, Rfl: 0  •  SYNJARDY 12.5-500 MG tablet, Take 1 tablet by mouth 2 (Two) Times a Day., Disp: 180 tablet, Rfl: 0  •  vitamin D (ERGOCALCIFEROL) 1.25 MG (19994 UT) capsule capsule, Take 1 capsule by mouth 1 (One) Time Per Week., Disp: 13 capsule, " Rfl: 0      The following portions of the patient's history were reviewed and updated as appropriate: allergies, current medications, past family history, past medical history, past social history, past surgical history and problem list.    Review of Systems   Constitutional: Negative.    HENT: Negative.    Eyes: Negative.    Respiratory: Negative.    Cardiovascular: Negative.    Gastrointestinal: Negative.    Endocrine: Negative.    Genitourinary: Negative.    Musculoskeletal: Negative.    Skin: Negative.    Allergic/Immunologic: Negative.    Neurological: Negative.    Hematological: Negative.    Psychiatric/Behavioral: Negative.    The above review of systems were reviewed, corroborated and accepted.    Objective   Physical Exam   Constitutional: She is oriented to person, place, and time. She appears well-developed and well-nourished. No distress.   HENT:   Head: Normocephalic and atraumatic.   Right Ear: External ear normal.   Left Ear: External ear normal.   Nose: Nose normal.   Mouth/Throat: Oropharynx is clear and moist. No oropharyngeal exudate.   Eyes: Pupils are equal, round, and reactive to light. Conjunctivae and EOM are normal. Right eye exhibits no discharge. Left eye exhibits no discharge. No scleral icterus.   Neck: Normal range of motion. Neck supple. No JVD present. No tracheal deviation present. No thyromegaly present.   Cardiovascular: Normal rate, regular rhythm, normal heart sounds and intact distal pulses. Exam reveals no gallop and no friction rub.   No murmur heard.  Pulmonary/Chest: Effort normal and breath sounds normal. No stridor. No respiratory distress. She has no wheezes. She has no rales. She exhibits no tenderness.   Abdominal: Soft. Bowel sounds are normal. She exhibits no distension and no mass. There is no tenderness. There is no rebound and no guarding. No hernia.   Musculoskeletal: Normal range of motion. She exhibits no edema, tenderness or deformity.   Lymphadenopathy:      She has no cervical adenopathy.   Neurological: She is alert and oriented to person, place, and time. She has normal reflexes. She displays normal reflexes. No cranial nerve deficit or sensory deficit. She exhibits normal muscle tone. Coordination normal.   Skin: Skin is warm and dry. No rash noted. She is not diaphoretic. No erythema. No pallor.   Psychiatric: She has a normal mood and affect. Her behavior is normal. Judgment and thought content normal.   Nursing note and vitals reviewed.  No significant change since 11/29/2018 office visit.    Results for orders placed or performed in visit on 05/22/20   Comprehensive Metabolic Panel   Result Value Ref Range    Glucose 95 65 - 99 mg/dL    BUN 12 6 - 20 mg/dL    Creatinine 0.69 0.57 - 1.00 mg/dL    eGFR Non African Am 89 >60 mL/min/1.73    eGFR African Am 108 >60 mL/min/1.73    BUN/Creatinine Ratio 17.4 7.0 - 25.0    Sodium 141 136 - 145 mmol/L    Potassium 4.5 3.5 - 5.2 mmol/L    Chloride 103 98 - 107 mmol/L    Total CO2 25.8 22.0 - 29.0 mmol/L    Calcium 9.6 8.6 - 10.5 mg/dL    Total Protein 6.5 6.0 - 8.5 g/dL    Albumin 4.70 3.50 - 5.20 g/dL    Globulin 1.8 gm/dL    A/G Ratio 2.6 g/dL    Total Bilirubin 0.9 0.2 - 1.2 mg/dL    Alkaline Phosphatase 41 39 - 117 U/L    AST (SGOT) 12 1 - 32 U/L    ALT (SGPT) 16 1 - 33 U/L   Lipid Panel   Result Value Ref Range    Total Cholesterol 119 0 - 200 mg/dL    Triglycerides 61 0 - 150 mg/dL    HDL Cholesterol 54 40 - 60 mg/dL    VLDL Cholesterol 12.2 5 - 40 mg/dL    LDL Cholesterol  53 0 - 100 mg/dL   T4 & TSH ([facility])   Result Value Ref Range    TSH 2.290 0.270 - 4.200 uIU/mL    T4, Total 10.20 4.50 - 11.70 mcg/dL   Hemoglobin A1c   Result Value Ref Range    Hemoglobin A1C 6.20 (H) 4.80 - 5.60 %   T4, Free   Result Value Ref Range    Free T4 1.54 0.93 - 1.70 ng/dL   Luteinizing Hormone   Result Value Ref Range    LH 20.0 mIU/mL   Follicle Stimulating Hormone   Result Value Ref Range    FSH 41.7 mIU/mL   C-Peptide   Result  Value Ref Range    C-Peptide 3.1 1.1 - 4.4 ng/mL   Vitamin D 25 Hydroxy   Result Value Ref Range    25 Hydroxy, Vitamin D 58.7 30.0 - 100.0 ng/ml   MicroAlbumin, Urine, Random -   Result Value Ref Range    Microalbumin, Urine <3.0 Not Estab. ug/mL   Uric Acid   Result Value Ref Range    Uric Acid 2.2 (L) 2.4 - 5.7 mg/dL   T3, Free   Result Value Ref Range    T3, Free 3.1 2.0 - 4.4 pg/mL   Cardiovascular Risk Assessment   Result Value Ref Range    Interpretation Note    Diabetes Patient Education   Result Value Ref Range    PDF Image Not applicable          Assessment/Plan   Maru was seen today for diabetes.    Diagnoses and all orders for this visit:    Uncontrolled type 2 diabetes mellitus with complication, without long-term current use of insulin (CMS/[facility])  -     T4 & TSH ([facility]); Future  -     T3, Free; Future  -     T4, Free; Future  -     Thyroglobulin With Anti-TG; Future  -     Uric Acid; Future  -     Vitamin D 25 Hydroxy; Future  -     Comprehensive Metabolic Panel; Future  -     C-Peptide; Future  -     Hemoglobin A1c; Future  -     MicroAlbumin, Urine, Random - Urine, Clean Catch; Future  -     NMR LipoProfile; Future  -     JANUVIA 100 MG tablet; Take 1 tablet by mouth Daily.  -     pioglitazone (ACTOS) 30 MG tablet; Take 1 tablet by mouth Daily.  -     SYNJARDY 12.5-500 MG tablet; Take 1 tablet by mouth 2 (Two) Times a Day.  -     Follicle Stimulating Hormone; Future  -     Luteinizing Hormone; Future    Mixed hyperlipidemia  -     T4 & TSH ([facility]); Future  -     T3, Free; Future  -     T4, Free; Future  -     Thyroglobulin With Anti-TG; Future  -     Uric Acid; Future  -     Vitamin D 25 Hydroxy; Future  -     Comprehensive Metabolic Panel; Future  -     C-Peptide; Future  -     Hemoglobin A1c; Future  -     MicroAlbumin, Urine, Random - Urine, Clean Catch; Future  -     NMR LipoProfile; Future  -     omega-3 acid ethyl esters (Lovaza) 1 g capsule; Take 2 capsules by mouth 2 (Two) Times a  Day.  -     rosuvastatin (CRESTOR) 20 MG tablet; Take 1 tablet by mouth Every Night.  -     Follicle Stimulating Hormone; Future  -     Luteinizing Hormone; Future    Essential hypertension  -     T4 & TSH ([facility]); Future  -     T3, Free; Future  -     T4, Free; Future  -     Thyroglobulin With Anti-TG; Future  -     Uric Acid; Future  -     Vitamin D 25 Hydroxy; Future  -     Comprehensive Metabolic Panel; Future  -     C-Peptide; Future  -     Hemoglobin A1c; Future  -     MicroAlbumin, Urine, Random - Urine, Clean Catch; Future  -     NMR LipoProfile; Future  -     Follicle Stimulating Hormone; Future  -     Luteinizing Hormone; Future    PARKER (nonalcoholic steatohepatitis)  -     T4 & TSH ([facility]); Future  -     T3, Free; Future  -     T4, Free; Future  -     Thyroglobulin With Anti-TG; Future  -     Uric Acid; Future  -     Vitamin D 25 Hydroxy; Future  -     Comprehensive Metabolic Panel; Future  -     C-Peptide; Future  -     Hemoglobin A1c; Future  -     MicroAlbumin, Urine, Random - Urine, Clean Catch; Future  -     NMR LipoProfile; Future  -     Follicle Stimulating Hormone; Future  -     Luteinizing Hormone; Future    Vitamin D deficiency  -     T4 & TSH ([facility]); Future  -     T3, Free; Future  -     T4, Free; Future  -     Thyroglobulin With Anti-TG; Future  -     Uric Acid; Future  -     Vitamin D 25 Hydroxy; Future  -     Comprehensive Metabolic Panel; Future  -     C-Peptide; Future  -     Hemoglobin A1c; Future  -     MicroAlbumin, Urine, Random - Urine, Clean Catch; Future  -     NMR LipoProfile; Future  -     vitamin D (ERGOCALCIFEROL) 1.25 MG (58181 UT) capsule capsule; Take 1 capsule by mouth 1 (One) Time Per Week.  -     Follicle Stimulating Hormone; Future  -     Luteinizing Hormone; Future    Primary hypothyroidism  -     T4 & TSH ([facility]); Future  -     T3, Free; Future  -     T4, Free; Future  -     Thyroglobulin With Anti-TG; Future  -     Uric Acid; Future  -     Vitamin D 25  Hydroxy; Future  -     Comprehensive Metabolic Panel; Future  -     C-Peptide; Future  -     Hemoglobin A1c; Future  -     MicroAlbumin, Urine, Random - Urine, Clean Catch; Future  -     NMR LipoProfile; Future  -     Follicle Stimulating Hormone; Future  -     Luteinizing Hormone; Future        In summary I saw and examined this 54-year-old female for above-mentioned problems.  I reviewed her laboratory evaluations of 5/22/2020 and provided her with a hard copy of it.  She is overall clinically and metabolically stable and therefore we will go ahead and continue all her current prescriptions.  I will see her in 6 months or sooner if needed with laboratory evaluation prior to each office visit.

## 2020-09-03 DIAGNOSIS — IMO0002 UNCONTROLLED TYPE 2 DIABETES MELLITUS WITH COMPLICATION, WITHOUT LONG-TERM CURRENT USE OF INSULIN: ICD-10-CM

## 2020-09-03 RX ORDER — LOSARTAN POTASSIUM 25 MG/1
TABLET ORAL
Qty: 90 TABLET | Refills: 0 | Status: SHIPPED | OUTPATIENT
Start: 2020-09-03

## 2020-11-23 ENCOUNTER — RESULTS ENCOUNTER (OUTPATIENT)
Dept: ENDOCRINOLOGY | Age: 55
End: 2020-11-23

## 2020-11-23 DIAGNOSIS — E03.9 PRIMARY HYPOTHYROIDISM: ICD-10-CM

## 2020-11-23 DIAGNOSIS — E55.9 VITAMIN D DEFICIENCY: ICD-10-CM

## 2020-11-23 DIAGNOSIS — K75.81 NASH (NONALCOHOLIC STEATOHEPATITIS): ICD-10-CM

## 2020-11-23 DIAGNOSIS — I10 ESSENTIAL HYPERTENSION: ICD-10-CM

## 2020-11-23 DIAGNOSIS — IMO0002 UNCONTROLLED TYPE 2 DIABETES MELLITUS WITH COMPLICATION, WITHOUT LONG-TERM CURRENT USE OF INSULIN: ICD-10-CM

## 2020-11-23 DIAGNOSIS — E78.2 MIXED HYPERLIPIDEMIA: ICD-10-CM

## 2020-11-25 ENCOUNTER — RESULTS ENCOUNTER (OUTPATIENT)
Dept: ENDOCRINOLOGY | Age: 55
End: 2020-11-25

## 2020-11-25 DIAGNOSIS — IMO0002 UNCONTROLLED TYPE 2 DIABETES MELLITUS WITH COMPLICATION, WITHOUT LONG-TERM CURRENT USE OF INSULIN: ICD-10-CM

## 2020-11-25 DIAGNOSIS — E78.2 MIXED HYPERLIPIDEMIA: ICD-10-CM

## 2020-11-25 DIAGNOSIS — E03.9 PRIMARY HYPOTHYROIDISM: ICD-10-CM

## 2020-11-25 DIAGNOSIS — I10 ESSENTIAL HYPERTENSION: ICD-10-CM

## 2020-11-25 DIAGNOSIS — E55.9 VITAMIN D DEFICIENCY: ICD-10-CM

## 2020-11-25 DIAGNOSIS — K75.81 NASH (NONALCOHOLIC STEATOHEPATITIS): ICD-10-CM
